# Patient Record
Sex: FEMALE | Race: WHITE | NOT HISPANIC OR LATINO | Employment: PART TIME | URBAN - METROPOLITAN AREA
[De-identification: names, ages, dates, MRNs, and addresses within clinical notes are randomized per-mention and may not be internally consistent; named-entity substitution may affect disease eponyms.]

---

## 2024-10-07 ENCOUNTER — APPOINTMENT (OUTPATIENT)
Age: 54
End: 2024-10-07
Payer: OTHER GOVERNMENT

## 2024-10-07 ENCOUNTER — OFFICE VISIT (OUTPATIENT)
Age: 54
End: 2024-10-07
Payer: OTHER GOVERNMENT

## 2024-10-07 VITALS
TEMPERATURE: 96.9 F | HEIGHT: 64 IN | SYSTOLIC BLOOD PRESSURE: 128 MMHG | DIASTOLIC BLOOD PRESSURE: 86 MMHG | RESPIRATION RATE: 16 BRPM | BODY MASS INDEX: 21.68 KG/M2 | HEART RATE: 57 BPM | OXYGEN SATURATION: 100 % | WEIGHT: 126.98 LBS

## 2024-10-07 DIAGNOSIS — M25.551 PAIN OF RIGHT HIP: ICD-10-CM

## 2024-10-07 DIAGNOSIS — M25.551 PAIN OF RIGHT HIP: Primary | ICD-10-CM

## 2024-10-07 DIAGNOSIS — M54.41 ACUTE RIGHT-SIDED LOW BACK PAIN WITH RIGHT-SIDED SCIATICA: ICD-10-CM

## 2024-10-07 PROCEDURE — 73502 X-RAY EXAM HIP UNI 2-3 VIEWS: CPT

## 2024-10-07 PROCEDURE — G0463 HOSPITAL OUTPT CLINIC VISIT: HCPCS | Performed by: EMERGENCY MEDICINE

## 2024-10-07 PROCEDURE — 99203 OFFICE O/P NEW LOW 30 MIN: CPT | Performed by: EMERGENCY MEDICINE

## 2024-10-07 RX ORDER — ALBUTEROL SULFATE 90 UG/1
INHALANT RESPIRATORY (INHALATION)
COMMUNITY
Start: 2024-09-19

## 2024-10-07 RX ORDER — ZIPRASIDONE HYDROCHLORIDE 80 MG/1
CAPSULE ORAL
COMMUNITY
Start: 2024-08-15

## 2024-10-07 RX ORDER — LORAZEPAM 1 MG/1
TABLET ORAL
COMMUNITY
Start: 2024-09-12

## 2024-10-07 RX ORDER — CYCLOBENZAPRINE HCL 10 MG
TABLET ORAL
COMMUNITY
Start: 2024-09-23

## 2024-10-07 RX ORDER — PREDNISONE 10 MG/1
TABLET ORAL
Qty: 19 TABLET | Refills: 0 | Status: SHIPPED | OUTPATIENT
Start: 2024-10-07

## 2024-10-07 RX ORDER — ZOLPIDEM TARTRATE 10 MG/1
TABLET ORAL
COMMUNITY
Start: 2024-09-12

## 2024-10-07 RX ORDER — TRIHEXYPHENIDYL HYDROCHLORIDE 2 MG/1
TABLET ORAL
COMMUNITY
Start: 2024-07-23

## 2024-10-07 NOTE — PATIENT INSTRUCTIONS
"Patient Education     Hip pain in adults   The Basics   Written by the doctors and editors at [facility]   What causes hip pain? -- Many different things can cause hip pain. Sometimes, pain is related to an injury. Other causes include:   Arthritis - This is the general term for \"inflammation or damage of the joints.\" People whose hip pain is caused by arthritis usually develop pain in the groin or thigh, slowly over time.   Bursitis - There are 3 sacs called \"bursae\" in or near the hip (figure 1). These sacs are filled with fluid. They help cushion and protect the joints. \"Bursitis\" is when 1 of these sacs gets irritated or inflamed. People whose hip pain is caused by bursitis usually feel more pain if they lie on their side, or if someone presses against the side of their hip.   Muscle or tendon strain - Three major muscle groups help move the hip. If you overuse these muscles or the tendons that attach them to your bones, it can lead to hip pain. This pain is usually worse when you move your leg in 1 particular direction.   Nerve problems - Lots of nerves pass by the hip. These nerves or nerves in the lower part of the spine can get pressed on or damaged and cause hip pain. People with pain caused by nerve problems also often feel tingling or numbness in the area. A pinched nerve in the spine could cause other problems, such as weakness in the leg.  How is hip pain treated? -- The right treatment depends on what is causing it. In general, treatments might include:   Taking medicines to reduce pain and/or inflammation   Getting a shot of a steroid medicine, which can reduce inflammation   Physical therapy or exercises recommended by the doctor  If you have severe hip arthritis, your doctor or nurse might suggest surgery to replace the hip.  What can I do on my own to feel better? -- You can:   Ask your doctor if there are stretches or exercises that can help with the cause of your pain. Before you do these " "exercises, warm up your muscles by walking or taking a warm shower or bath.   Consider taking non-prescription pain medicines, such as acetaminophen (sample brand name: Tylenol) or naproxen (sample brand name: Aleve). But if you have heart disease or other health problems, or if you are on prescription medicines, ask your doctor before you start taking any new medicines.   Use a cane, walker, shoe insert, or other device, if it helps you.   Apply a cold gel pack, bag of ice, or bag of frozen vegetables on your hip, if it helps with your pain. Do this every 1 to 2 hours, for 15 minutes each time. Put a thin towel between the ice (or other cold object) and your skin.  When should I call the doctor? -- Call for advice if:   Your pain is sudden and severe, prevents you from putting weight on that side, or is so bad that you can't rotate your leg to the side. Some people who have hip pain actually have a broken hip bone. This is especially likely after a fall or even a mild impact. Having a broken hip is serious and needs immediate medical attention.   Your hip is swollen, bruised, or bleeding.   You also have a fever of 100.4°F (38°C) or higher along with your hip pain.   You have weakness in 1 of your legs or feet.   Your toes or foot are numb or turn, blue, gray, or pale.  All topics are updated as new evidence becomes available and our peer review process is complete.  This topic retrieved from [employer] on: Apr 24, 2024.  Topic 61271 Version 15.0  Release: 32.3.2 - C32.113  © 2024 UpToDate, Inc. and/or its affiliates. All rights reserved.  figure 1: Bursae near the hip     These sacs, called \"bursae,\" are filled with fluid. They help cushion and protect the joints. \"Bursitis\" is the medical term for when 1 of these sacs gets irritated or inflamed.  Graphic 09276 Version 8.0  Consumer Information Use and Disclaimer   Disclaimer: This generalized information is a limited summary of diagnosis, treatment, and/or " medication information. It is not meant to be comprehensive and should be used as a tool to help the user understand and/or assess potential diagnostic and treatment options. It does NOT include all information about conditions, treatments, medications, side effects, or risks that may apply to a specific patient. It is not intended to be medical advice or a substitute for the medical advice, diagnosis, or treatment of a health care provider based on the health care provider's examination and assessment of a patient's specific and unique circumstances. Patients must speak with a health care provider for complete information about their health, medical questions, and treatment options, including any risks or benefits regarding use of medications. This information does not endorse any treatments or medications as safe, effective, or approved for treating a specific patient. UpToDate, Inc. and its affiliates disclaim any warranty or liability relating to this information or the use thereof.The use of this information is governed by the Terms of Use, available at https://www.[employer].com/en/know/clinical-effectiveness-terms. 2024© UpToDate, Inc. and its affiliates and/or licensors. All rights reserved.  Copyright   © 2024 UpToDate, Inc. and/or its affiliates. All rights reserved.  Patient Education     Low back pain - Discharge instructions   The Basics   Written by the doctors and editors at [facility]   What are discharge instructions? -- Discharge instructions are information about how to take care of yourself after getting medical care for a health problem.  What is low back pain? -- Low back pain is pain or discomfort in the lower part of your back. Many people have low back pain at some point, and it most often gets better on its own. Many different things can cause low back pain. Most of the time, doctors do not know the exact cause.  Back pain can happen if you strain a muscle. This is often what has happened  "when a person \"throws out\" their back. This refers to pain that starts suddenly after physical activity, like lifting something heavy or bending the back.  Back pain can also happen if you have (figure 1):   Damaged, bulging, or torn discs   Arthritis affecting the joints of the spine   Bony growths on the vertebrae that crowd nearby nerves   A \"compression fracture\" due to osteoporosis (a condition that makes your bones weak)   A vertebra out of place   Narrowing in the spinal canal   A tumor or infection (but this is very rare)  How do I care for myself at home? -- Ask the doctor or nurse what you should do when you go home. Make sure that you understand exactly what you need to do to care for yourself. Ask questions if there is anything you do not understand.  You should also:   Use heat on your back for short periods of time, if it helps with pain. Put a heating pad (on the low setting) on your back for 20 minutes at a time a few times each day. Never go to sleep with heat on your back.   Try to stay as active as you can without causing too much pain, if your doctor or nurse said that it is OK. If your pain is severe, you might need to rest for a day or 2. But it's important to get back to walking and moving as soon as possible. Try to keep doing your normal daily activities. Get up and move around gently during the day as you are able.   Slowly start to increase your activity level as you are able to. If something causes your pain to come back or get worse, stop and go back to doing easier activities that did not hurt.   Avoid sitting or standing in 1 position for a long time. You might want to sleep with a pillow under or between your knees if this eases your pain.   Take a medicine like ibuprofen (sample brand names: Advil, Motrin) or naproxen (brand name: Aleve) for pain, if needed. These are nonsteroidal antiinflammatory drugs (\"NSAIDs\"). They might work better than acetaminophen for low back pain.   Talk " "to your doctor or nurse before trying any of the following. These treatments might help you feel better for a little while:   Spinal manipulation - This is when a chiropractor, physical therapist, or other professional moves or \"adjusts\" the joints of your back.   Acupuncture - This is when someone who knows traditional Chinese medicine inserts tiny needles through your skin to block pain signals.   Massage therapy - The massage therapist manipulates your muscles and soft tissues to decrease muscle tension and increase relaxation.  What follow-up care do I need? -- Your doctor or nurse will tell you if you need to make a follow-up appointment. If so, make sure that you know when and where to go. The doctor might suggest that you see a physical therapist to learn exercises to help with your back pain.  When should I call the doctor? -- Call for emergency help right away (in the [place] and [place], call 9-1-1) if:   You are unable to walk, or cannot control your bowels or bladder.   You develop a fever of 100.4°F (38°C) or higher, chills, or night sweats.  Call your doctor for advice if:   Your legs are numb, weak, or tingly.   Your pain is getting worse, even with medicines and rest.   You develop a new rash.  All topics are updated as new evidence becomes available and our peer review process is complete.  This topic retrieved from [employer] on: May 15, 2024.  Topic 492168 Version 1.0  Release: 32.4.3 - C32.134  © 2024 UpToDate, Inc. and/or its affiliates. All rights reserved.  figure 1: Anatomy of the back     This drawing shows the different parts of the back. Back pain can be caused by problems with the muscles, ligaments, discs, bones (vertebrae), or nerves.  Graphic 38199 Version 6.0  Consumer Information Use and Disclaimer   Disclaimer: This generalized information is a limited summary of diagnosis, treatment, and/or medication information. It is not meant to be comprehensive and should be used as a tool to help the " user understand and/or assess potential diagnostic and treatment options. It does NOT include all information about conditions, treatments, medications, side effects, or risks that may apply to a specific patient. It is not intended to be medical advice or a substitute for the medical advice, diagnosis, or treatment of a health care provider based on the health care provider's examination and assessment of a patient's specific and unique circumstances. Patients must speak with a health care provider for complete information about their health, medical questions, and treatment options, including any risks or benefits regarding use of medications. This information does not endorse any treatments or medications as safe, effective, or approved for treating a specific patient. UpToDate, Inc. and its affiliates disclaim any warranty or liability relating to this information or the use thereof.The use of this information is governed by the Terms of Use, available at https://www.[employer].com/en/know/clinical-effectiveness-terms. 2024© UpToDate, Inc. and its affiliates and/or licensors. All rights reserved.  Copyright   © 2024 UpToDate, Inc. and/or its affiliates. All rights reserved.

## 2024-10-07 NOTE — PROGRESS NOTES
"[facility]        NAME: Em Koo is a 54 y.o. female  : 1970    MRN: [id number]  DATE: 2024  TIME: 9:54 AM    Assessment and Plan   Pain of right hip [M25.551]  1. Pain of right hip  XR hip/pelv 2-3 vws right if performed    predniSONE 10 mg tablet    Ambulatory Referral to Physical Therapy      2. Acute right-sided low back pain with right-sided sciatica  predniSONE 10 mg tablet    Ambulatory Referral to Physical Therapy              Patient Instructions     Patient Instructions   Patient Education     Hip pain in adults   The Basics   Written by the doctors and editors at [facility]   What causes hip pain? -- Many different things can cause hip pain. Sometimes, pain is related to an injury. Other causes include:   Arthritis - This is the general term for \"inflammation or damage of the joints.\" People whose hip pain is caused by arthritis usually develop pain in the groin or thigh, slowly over time.   Bursitis - There are 3 sacs called \"bursae\" in or near the hip (figure 1). These sacs are filled with fluid. They help cushion and protect the joints. \"Bursitis\" is when 1 of these sacs gets irritated or inflamed. People whose hip pain is caused by bursitis usually feel more pain if they lie on their side, or if someone presses against the side of their hip.   Muscle or tendon strain - Three major muscle groups help move the hip. If you overuse these muscles or the tendons that attach them to your bones, it can lead to hip pain. This pain is usually worse when you move your leg in 1 particular direction.   Nerve problems - Lots of nerves pass by the hip. These nerves or nerves in the lower part of the spine can get pressed on or damaged and cause hip pain. People with pain caused by nerve problems also often feel tingling or numbness in the area. A pinched nerve in the spine could cause other problems, such as weakness in the leg.  How is hip pain treated? -- The right treatment depends " on what is causing it. In general, treatments might include:   Taking medicines to reduce pain and/or inflammation   Getting a shot of a steroid medicine, which can reduce inflammation   Physical therapy or exercises recommended by the doctor  If you have severe hip arthritis, your doctor or nurse might suggest surgery to replace the hip.  What can I do on my own to feel better? -- You can:   Ask your doctor if there are stretches or exercises that can help with the cause of your pain. Before you do these exercises, warm up your muscles by walking or taking a warm shower or bath.   Consider taking non-prescription pain medicines, such as acetaminophen (sample brand name: Tylenol) or naproxen (sample brand name: Aleve). But if you have heart disease or other health problems, or if you are on prescription medicines, ask your doctor before you start taking any new medicines.   Use a cane, walker, shoe insert, or other device, if it helps you.   Apply a cold gel pack, bag of ice, or bag of frozen vegetables on your hip, if it helps with your pain. Do this every 1 to 2 hours, for 15 minutes each time. Put a thin towel between the ice (or other cold object) and your skin.  When should I call the doctor? -- Call for advice if:   Your pain is sudden and severe, prevents you from putting weight on that side, or is so bad that you can't rotate your leg to the side. Some people who have hip pain actually have a broken hip bone. This is especially likely after a fall or even a mild impact. Having a broken hip is serious and needs immediate medical attention.   Your hip is swollen, bruised, or bleeding.   You also have a fever of 100.4°F (38°C) or higher along with your hip pain.   You have weakness in 1 of your legs or feet.   Your toes or foot are numb or turn, blue, gray, or pale.  All topics are updated as new evidence becomes available and our peer review process is complete.  This topic retrieved from [employer] on: Apr 24,  "2024.  Topic 41892 Version 15.0  Release: 32.3.2 - C32.113  © 2024 UpToDate, Inc. and/or its affiliates. All rights reserved.  figure 1: Bursae near the hip     These sacs, called \"bursae,\" are filled with fluid. They help cushion and protect the joints. \"Bursitis\" is the medical term for when 1 of these sacs gets irritated or inflamed.  Graphic 54973 Version 8.0  Consumer Information Use and Disclaimer   Disclaimer: This generalized information is a limited summary of diagnosis, treatment, and/or medication information. It is not meant to be comprehensive and should be used as a tool to help the user understand and/or assess potential diagnostic and treatment options. It does NOT include all information about conditions, treatments, medications, side effects, or risks that may apply to a specific patient. It is not intended to be medical advice or a substitute for the medical advice, diagnosis, or treatment of a health care provider based on the health care provider's examination and assessment of a patient's specific and unique circumstances. Patients must speak with a health care provider for complete information about their health, medical questions, and treatment options, including any risks or benefits regarding use of medications. This information does not endorse any treatments or medications as safe, effective, or approved for treating a specific patient. UpToDate, Inc. and its affiliates disclaim any warranty or liability relating to this information or the use thereof.The use of this information is governed by the Terms of Use, available at https://www.wolterskluwer.com/en/know/clinical-effectiveness-terms. 2024© UpToDate, Inc. and its affiliates and/or licensors. All rights reserved.  Copyright   © 2024 UpToDate, Inc. and/or its affiliates. All rights reserved.  Patient Education     Low back pain - Discharge instructions   The Basics   Written by the doctors and editors at [employer]   What are discharge " "instructions? -- Discharge instructions are information about how to take care of yourself after getting medical care for a health problem.  What is low back pain? -- Low back pain is pain or discomfort in the lower part of your back. Many people have low back pain at some point, and it most often gets better on its own. Many different things can cause low back pain. Most of the time, doctors do not know the exact cause.  Back pain can happen if you strain a muscle. This is often what has happened when a person \"throws out\" their back. This refers to pain that starts suddenly after physical activity, like lifting something heavy or bending the back.  Back pain can also happen if you have (figure 1):   Damaged, bulging, or torn discs   Arthritis affecting the joints of the spine   Bony growths on the vertebrae that crowd nearby nerves   A \"compression fracture\" due to osteoporosis (a condition that makes your bones weak)   A vertebra out of place   Narrowing in the spinal canal   A tumor or infection (but this is very rare)  How do I care for myself at home? -- Ask the doctor or nurse what you should do when you go home. Make sure that you understand exactly what you need to do to care for yourself. Ask questions if there is anything you do not understand.  You should also:   Use heat on your back for short periods of time, if it helps with pain. Put a heating pad (on the low setting) on your back for 20 minutes at a time a few times each day. Never go to sleep with heat on your back.   Try to stay as active as you can without causing too much pain, if your doctor or nurse said that it is OK. If your pain is severe, you might need to rest for a day or 2. But it's important to get back to walking and moving as soon as possible. Try to keep doing your normal daily activities. Get up and move around gently during the day as you are able.   Slowly start to increase your activity level as you are able to. If something " "causes your pain to come back or get worse, stop and go back to doing easier activities that did not hurt.   Avoid sitting or standing in 1 position for a long time. You might want to sleep with a pillow under or between your knees if this eases your pain.   Take a medicine like ibuprofen (sample brand names: Advil, Motrin) or naproxen (brand name: Aleve) for pain, if needed. These are nonsteroidal antiinflammatory drugs (\"NSAIDs\"). They might work better than acetaminophen for low back pain.   Talk to your doctor or nurse before trying any of the following. These treatments might help you feel better for a little while:   Spinal manipulation - This is when a chiropractor, physical therapist, or other professional moves or \"adjusts\" the joints of your back.   Acupuncture - This is when someone who knows traditional Chinese medicine inserts tiny needles through your skin to block pain signals.   Massage therapy - The massage therapist manipulates your muscles and soft tissues to decrease muscle tension and increase relaxation.  What follow-up care do I need? -- Your doctor or nurse will tell you if you need to make a follow-up appointment. If so, make sure that you know when and where to go. The doctor might suggest that you see a physical therapist to learn exercises to help with your back pain.  When should I call the doctor? -- Call for emergency help right away (in the [place] and [place], call 9-1-1) if:   You are unable to walk, or cannot control your bowels or bladder.   You develop a fever of 100.4°F (38°C) or higher, chills, or night sweats.  Call your doctor for advice if:   Your legs are numb, weak, or tingly.   Your pain is getting worse, even with medicines and rest.   You develop a new rash.  All topics are updated as new evidence becomes available and our peer review process is complete.  This topic retrieved from [employer] on: May 15, 2024.  Topic 473894 Version 1.0  Release: 32.4.3 - C32.134  © 2024 " UpToDate, Inc. and/or its affiliates. All rights reserved.  figure 1: Anatomy of the back     This drawing shows the different parts of the back. Back pain can be caused by problems with the muscles, ligaments, discs, bones (vertebrae), or nerves.  Graphic 87537 Version 6.0  Consumer Information Use and Disclaimer   Disclaimer: This generalized information is a limited summary of diagnosis, treatment, and/or medication information. It is not meant to be comprehensive and should be used as a tool to help the user understand and/or assess potential diagnostic and treatment options. It does NOT include all information about conditions, treatments, medications, side effects, or risks that may apply to a specific patient. It is not intended to be medical advice or a substitute for the medical advice, diagnosis, or treatment of a health care provider based on the health care provider's examination and assessment of a patient's specific and unique circumstances. Patients must speak with a health care provider for complete information about their health, medical questions, and treatment options, including any risks or benefits regarding use of medications. This information does not endorse any treatments or medications as safe, effective, or approved for treating a specific patient. UpToDate, Inc. and its affiliates disclaim any warranty or liability relating to this information or the use thereof.The use of this information is governed by the Terms of Use, available at https://www.WomplytersDashbookuwer.com/en/know/clinical-effectiveness-terms. 2024© UpToDate, Inc. and its affiliates and/or licensors. All rights reserved.  Copyright   © 2024 UpToDate, Inc. and/or its affiliates. All rights reserved.      Follow up with PCP in 3-5 days.  Proceed to  ER if symptoms worsen.    Chief Complaint     Chief Complaint   Patient presents with    Hip Pain     Runner. X1 month ago was on a longer run, after she felt stiff. She did continue to  treat with ice, heat, stim. After that she reduced the mileage she was running and hip/right low back pain continued. Seen at [facility] on 9/23/24. At this point, she is unable to perform at work like she used to. She finished prednisone, muscle relaxer.          History of Present Illness       Patient complains of right hip and right low back pain for the past month onset while distance running and worsening since.  Patient seen at [facility] 2 weeks ago for same prescribed prednisone taper [id number] which ended 1 week ago and muscle relaxant which she has 2 pills remaining.  She notes pain now present not only when running but when at work.        Review of Systems   Review of Systems   Constitutional:  Negative for activity change.   Gastrointestinal:  Negative for abdominal pain.   Musculoskeletal:  Positive for arthralgias and gait problem. Negative for back pain, joint swelling, myalgias, neck pain and neck stiffness.   Skin:  Negative for color change and wound.   Neurological:  Negative for dizziness, syncope, weakness, light-headedness and headaches.         Current Medications       Current Outpatient Medications:     albuterol (PROVENTIL HFA,VENTOLIN HFA) 90 mcg/act inhaler, , Disp: , Rfl:     cyclobenzaprine (FLEXERIL) 10 mg tablet, , Disp: , Rfl:     LORazepam (ATIVAN) 1 mg tablet, , Disp: , Rfl:     predniSONE 10 mg tablet, Take once daily all days pills on this schedule 4 -4 -3 -3 -2 -2 -1, Disp: 19 tablet, Rfl: 0    trihexyphenidyl (ARTANE) 2 mg tablet, , Disp: , Rfl:     ziprasidone (GEODON) 80 mg capsule, , Disp: , Rfl:     zolpidem (AMBIEN) 10 mg tablet, , Disp: , Rfl:     Current Allergies     Allergies as of 10/07/2024    (No Known Allergies)            The following portions of the patient's history were reviewed and updated as appropriate: allergies, current medications, past family history, past medical history, past social history, past surgical history and problem list.  "    Past Medical History:   Diagnosis Date    Anxiety     Chronic paranoid schizophrenia (HCC)        Past Surgical History:   Procedure Laterality Date    ADENOIDECTOMY      TONSILLECTOMY         Family History   Problem Relation Age of Onset    Cancer Father         leukemia         Medications have been verified.        Objective   /86   Pulse 57   Temp (!) 96.9 °F (36.1 °C)   Resp 16   Ht 5' 4\" (1.626 m)   Wt 57.6 kg (126 lb 15.8 oz)   SpO2 100%   BMI 21.80 kg/m²        Physical Exam     Physical Exam  Vitals and nursing note reviewed.   Constitutional:       General: She is not in acute distress.     Appearance: She is well-developed. She is not ill-appearing.   HENT:      Head: Normocephalic and atraumatic.   Eyes:      Conjunctiva/sclera: Conjunctivae normal.      Pupils: Pupils are equal, round, and reactive to light.   Musculoskeletal:         General: Tenderness present. No swelling.      Cervical back: Normal range of motion and neck supple.      Comments: Limited internal rotation to 15 degrees both hips, tender over olecranon bursa right tender SI joint right   Skin:     General: Skin is warm and dry.      Findings: No rash.   Neurological:      Mental Status: She is alert and oriented to person, place, and time.      Deep Tendon Reflexes: Reflexes normal.   Psychiatric:         Mood and Affect: Mood normal.         Behavior: Behavior normal.         Thought Content: Thought content normal.         Judgment: Judgment normal.                   "

## 2024-11-01 ENCOUNTER — EVALUATION (OUTPATIENT)
Age: 54
End: 2024-11-01
Payer: MEDICARE

## 2024-11-01 DIAGNOSIS — Z74.09 IMPAIRED FUNCTIONAL MOBILITY, BALANCE, GAIT, AND ENDURANCE: ICD-10-CM

## 2024-11-01 DIAGNOSIS — R29.898 WEAKNESS OF RIGHT HIP: ICD-10-CM

## 2024-11-01 DIAGNOSIS — M25.551 RIGHT HIP PAIN: Primary | ICD-10-CM

## 2024-11-01 DIAGNOSIS — Z09 S/P ORTHOPEDIC SURGERY, FOLLOW-UP EXAM: ICD-10-CM

## 2024-11-01 PROCEDURE — 97112 NEUROMUSCULAR REEDUCATION: CPT

## 2024-11-01 PROCEDURE — 97110 THERAPEUTIC EXERCISES: CPT

## 2024-11-01 PROCEDURE — 97161 PT EVAL LOW COMPLEX 20 MIN: CPT

## 2024-11-01 NOTE — LETTER
2024    Zuly Nichole PA-C  [address] [facility] [address]    Patient: Em Koo   YOB: 1970   Date of Visit: 2024     Encounter Diagnosis     ICD-10-CM    1. Right hip pain  M25.551       2. S/P orthopedic surgery, follow-up exam  Z09       3. Impaired functional mobility, balance, gait, and endurance  Z74.09       4. Weakness of right hip  R29.898           Dear Dr. Nichole:    Thank you for your recent referral of Em Koo. Please review the attached evaluation summary from Em's recent visit.     Please verify that you agree with the plan of care by signing the attached order.     If you have any questions or concerns, please do not hesitate to call.     I sincerely appreciate the opportunity to share in the care of one of your patients and hope to have another opportunity to work with you in the near future.       Sincerely,    Cherie Monterroso, PT      Referring Provider:      I certify that I have read the below Plan of Care and certify the need for these services furnished under this plan of treatment while under my care.                    Zuly Nichole PA-C  [address] [facility] [address]  Via Fax: [phone]          PT Evaluation     Today's date: 2024  Patient name: Em Koo  : 1970  MRN: [id number]  Referring provider: Zuly Nichole PA-C  Dx:   Encounter Diagnosis     ICD-10-CM    1. Right hip pain  M25.551       2. S/P orthopedic surgery, follow-up exam  Z09       3. Impaired functional mobility, balance, gait, and endurance  Z74.09       4. Weakness of right hip  R29.898           Start Time: 815  Stop Time: 915  Total time in clinic (min): 60 minutes    Assessment  Impairments: abnormal gait, abnormal or restricted ROM, activity intolerance, impaired balance, impaired physical strength, lacks appropriate home exercise program, pain with function, safety issue, weight-bearing intolerance, unable to  perform ADL, participation limitations, activity limitations and endurance    Assessment details: Em is a 54 y.o. female who presents to outpatient physical therapy s/p R hip fracture and ORIF 10/12/24. Primary impairments include ROM deficits, strength deficits, decreased tolerance to activity, decreased muscular endurance, impaired balance, impaired neuromuscular control, and inability to complete basic ADLs . They present with the previously stated impairments which limit their ability to perform STS, toilet transfers, get into and out of a low riding car, step into and out of a tub shower, perform basic I/ADLs including dressing and donning/ doffing shoes and socks, and standing/ walking without AD/ modifications or increased pain/ discomfort. Pt is considered a fall risk based on time taken to complete the TUG. Provided pt with education regarding HEP, ice/ heat/ signs/ symptoms of infection and DVT, and anatomy of condition. Provided pt with an HEP. Em is currently functioning below their prior level of function and is a good rehab candidate who would benefit from skilled outpatient physical therapy services to allow them to reach their goals and return to OF. Pt recommended for 2x a week for 6-8 weeks. Pt prognosis for therapy is good. PT discussed POC and goals with patient, patient was agreeable.      Physical therapist assistant (PTA) may be utilized to administer treatments as appropriate and in accordance with [facility] Physical Therapy Practice Act.      Goals  STG: To be completed in 4 weeks from 11/1/24.   1. Em will be independent with basic HEP to allow patient to complete exercises safely when not directly supervised during PT session.    2. Em will report pain no worse than 3/10 at worst to indicate decreased pain level and improved participation with activity.  3. Em will have improved R hip flexion ROM to 60* and R hip extension ROM to 10* in order to indicate  progression to promoting normalized gait pattern.   4. Em will have improved R LE strength to 3+/5 in all motion to improve quality of ambulation and stair climbing.       LTG: To be completed in 8 weeks from 11/1/24.   1. Em will have an improved FOTO score of 54 in order to demonstrate decreased pain with activity, improved functional strength, improved participation with activity, and improved QOL.  2. Em will report pain no worse than 1/10 at worst to indicate decreased pain level and improved participation with activity.  3. Em will report 60% improvement in symptoms compared to start of POC to indicate functional improvement and decrease level of disability.    4. Em will be independent with advanced home exercise program to allow patient to transition from physical therapy care to continue with plan of care at home without supervision from therapist and continue to progress with rehabilitation.  5. Em will tolerate single limb stance for 10 seconds on R leg to allow patient to have increased stance time on R leg during ambulation.   6. Em will have improved R hip flexion ROM to 120* and R hip extension ROM to 30* in order to indicate normal hip ROM.  7. Em will have improved R LE strength to 4/5 in with all motions to improve quality of ambulation, transfers, and stair climbing.        Plan  Patient would benefit from: PT eval and skilled physical therapy  Planned modality interventions: biofeedback, cryotherapy, thermotherapy: hydrocollator packs, ultrasound and unattended electrical stimulation    Planned therapy interventions: balance, balance/weight bearing training, flexibility, functional ROM exercises, gait training, graded exercise, graded activity, graded motor, transfer training, therapeutic exercise, therapeutic activities, stretching, strengthening, patient education, IASTM, joint mobilization, kinesiology taping, manual therapy, massage, Greco taping,  "neuromuscular re-education, nerve gliding, patient/caregiver education, postural training, activity modification, ADL retraining, ADL training, abdominal trunk stabilization and home exercise program    Frequency: 2x week  Duration in weeks: 8  Plan of Care beginning date: 11/1/2024  Plan of Care expiration date: 12/27/2024  Treatment plan discussed with: patient        Subjective Evaluation    History of Present Illness  Mechanism of injury: Em is a 54 y.o. female. They present to outpatient physical therapy with the primary complaint of R hip pain.  They are referred to OPPT by Zuly Nichole PA-C.  Em reports she was having pain that was initially assumed to be back pain. Pt reports she had an X-ray of the R hip on 10/7/24. Pt was informed that she had R hip bursitis. Pt reports on 10/12 her R leg gave out, pt went for more imaging. Pt was informed from imaging that she had a fracture. Pt reports that she then underwent R hip surgery to fix the fracture. Pt currently ambulates with the RW using step through gait. Em is only able to sit for 30 minutes, stand for 1 hour with RW, and walk for about 30 minutes with RW before onset of symptoms. Pt reports that she is sleeping in a recliner. Em reports that they sleep supine. Pt reports sleep disturbances every 1.5-2 hours. Pt reports difficulty with STS, bed mobility, stair negotiation (ascending > descending) requiring b/l UE support, stepping into and out of a tub shower, getting into and out of a low car, and basic I/ADLs including dressing/ donning and doffing shoes and socks.     Patient Goals  Patient goals for therapy: decreased pain, improved balance, increased strength, independence with ADLs/IADLs, return to sport/leisure activities, return to work and increased motion  Patient goal: \" be able to drive\", \"be tyrone to walk without an AD\", \"be able to walk for about 1.25 miles with her dog\", \"be able to run 1 miles\"  Pain  Current pain rating: " 5 (proximal hamstring, groin, over the incision)  At best pain ratin  At worst pain ratin (R groin bakc of the R leg)  Quality: tight, pressure, burning, sharp, dull ache, radiating, cramping, discomfort, pulling, throbbing and squeezing  Relieving factors: medications, ice, change in position and rest (tylenol 500 mg as needed, massage)  Aggravating factors: sitting, standing, walking and stair climbing (STS transfers, toilet transfers, transfers from a low car, dressing, getting into and out of the tub shower)  Progression: improved    Social Support  Exterior steps/ramp assessed: 2 RICKY no railing.  Interior stairs assessed: 1 flight of stairs to the second floor and basement.   Lives in: multiple-level home  Lives with: spouse and adult children (dog)    Employment status: working (currently on leave; pt works on a [employer])  Hand dominance: right  Exercise history: run, bike, walking the dog, go to the gym, swimming          Objective     Active Range of Motion   Left Hip   Normal active range of motion    Right Hip   Flexion: 40 degrees with pain  Abduction: 18 (groin, glute, over incision) degrees with pain    Passive Range of Motion     Right Hip   Flexion: 50 degrees with pain  Abduction: Right hip passive abduction: did not perform due to pain.     Strength/Myotome Testing     Left Hip   Planes of Motion   Flexion: 4-  Abduction: 4  Adduction: 4+    Right Hip   Planes of Motion   Flexion: 2+  Abduction: 2+  Adduction: 2+    Left Knee   Flexion: 4+  Extension: 4    Right Knee   Flexion: 2+  Extension: 2+    Left Ankle/Foot   Dorsiflexion: 4+    Right Ankle/Foot   Dorsiflexion: 4-    Ambulation   Weight-Bearing Status   Weight-Bearing Status (Left): full weight bearing   Weight-Bearing Status (Right): weight-bearing as tolerated    Assistive device used: front-wheeled walker    Ambulation: Level Surfaces   Ambulation with assistive device: independent  Ambulation without assistive device:  "unable    Ambulation: Stairs   Ascend stairs: independent (with RW)  Pattern: non-reciprocal  Use of stair rails: with RW.    Observational Gait   Gait: antalgic   Decreased walking speed, stride length and right stance time.   Right foot contact pattern: foot flat    Comments   Pt presented with RW using step to gait mechanics.    Functional Assessment        Comments  IE: 24  - TU seconds from standard height chair with b/l UE support and RW using step to gait  - 30 STS: x5 from standard height chair with b/l UE support      Flowsheet Rows      Flowsheet Row Most Recent Value   PT/OT G-Codes    Current Score 29   Projected Score 54               Precautions: standard precautions, anxiety, chronic paranoid schizophrenia       Date:             Visit #: 1 2 3 4 5 6 7 8 9 10   Manuals             PROM/ stretching nv            Hip mobs (distraction/ ant/ post) nv            STM/ IASTM             Scar massage nv            Manual resistance nv                         Neuro Re-Ed             EDU/ HEP EDU on HEP, ice/ heat, signs/ symptoms of infection, and anatomy of condition            Quad set nv            Heel slides nv            Hip ABD slides nv            Seated marches nv            STS nv            Step ups             Step downs                                                    Ther Ex             Nustep nv            bike             Hip ABD PTB 10x5\" holds ea            Hip ADD 10x5\" holds ea            Standing marches              Standing foot slides  3 directions nv            Leg press                          Ther Activity                                       Gait Training                                       Modalities                                                            "

## 2024-11-01 NOTE — PROGRESS NOTES
PT Evaluation     Today's date: 2024  Patient name: Em Koo  : 1970  MRN: [id number]  Referring provider: Zuly Nichole PA-C  Dx:   Encounter Diagnosis     ICD-10-CM    1. Right hip pain  M25.551       2. S/P orthopedic surgery, follow-up exam  Z09       3. Impaired functional mobility, balance, gait, and endurance  Z74.09       4. Weakness of right hip  R29.898           Start Time: 815  Stop Time: 915  Total time in clinic (min): 60 minutes    Assessment  Impairments: abnormal gait, abnormal or restricted ROM, activity intolerance, impaired balance, impaired physical strength, lacks appropriate home exercise program, pain with function, safety issue, weight-bearing intolerance, unable to perform ADL, participation limitations, activity limitations and endurance    Assessment details: Em is a 54 y.o. female who presents to outpatient physical therapy s/p R hip fracture and ORIF 10/12/24. Primary impairments include ROM deficits, strength deficits, decreased tolerance to activity, decreased muscular endurance, impaired balance, impaired neuromuscular control, and inability to complete basic ADLs . They present with the previously stated impairments which limit their ability to perform STS, toilet transfers, get into and out of a low riding car, step into and out of a tub shower, perform basic I/ADLs including dressing and donning/ doffing shoes and socks, and standing/ walking without AD/ modifications or increased pain/ discomfort. Pt is considered a fall risk based on time taken to complete the TUG. Provided pt with education regarding HEP, ice/ heat/ signs/ symptoms of infection and DVT, and anatomy of condition. Provided pt with an HEP. Em is currently functioning below their prior level of function and is a good rehab candidate who would benefit from skilled outpatient physical therapy services to allow them to reach their goals and return to PLOF. Pt recommended for 2x a week  for 6-8 weeks. Pt prognosis for therapy is good. PT discussed POC and goals with patient, patient was agreeable.      Physical therapist assistant (PTA) may be utilized to administer treatments as appropriate and in accordance with [facility] Physical Therapy Practice Act.      Goals  STG: To be completed in 4 weeks from 11/1/24.   1. Em will be independent with basic HEP to allow patient to complete exercises safely when not directly supervised during PT session.    2. Em will report pain no worse than 3/10 at worst to indicate decreased pain level and improved participation with activity.  3. Em will have improved R hip flexion ROM to 60* and R hip extension ROM to 10* in order to indicate progression to promoting normalized gait pattern.   4. Em will have improved R LE strength to 3+/5 in all motion to improve quality of ambulation and stair climbing.       LTG: To be completed in 8 weeks from 11/1/24.   1. Em will have an improved FOTO score of 54 in order to demonstrate decreased pain with activity, improved functional strength, improved participation with activity, and improved QOL.  2. Em will report pain no worse than 1/10 at worst to indicate decreased pain level and improved participation with activity.  3. Em will report 60% improvement in symptoms compared to start of POC to indicate functional improvement and decrease level of disability.    4. Em will be independent with advanced home exercise program to allow patient to transition from physical therapy care to continue with plan of care at home without supervision from therapist and continue to progress with rehabilitation.  5. Em will tolerate single limb stance for 10 seconds on R leg to allow patient to have increased stance time on R leg during ambulation.   6. Em will have improved R hip flexion ROM to 120* and R hip extension ROM to 30* in order to indicate normal hip ROM.  7. Em will have  improved R LE strength to 4/5 in with all motions to improve quality of ambulation, transfers, and stair climbing.        Plan  Patient would benefit from: PT eval and skilled physical therapy  Planned modality interventions: biofeedback, cryotherapy, thermotherapy: hydrocollator packs, ultrasound and unattended electrical stimulation    Planned therapy interventions: balance, balance/weight bearing training, flexibility, functional ROM exercises, gait training, graded exercise, graded activity, graded motor, transfer training, therapeutic exercise, therapeutic activities, stretching, strengthening, patient education, IASTM, joint mobilization, kinesiology taping, manual therapy, massage, Greco taping, neuromuscular re-education, nerve gliding, patient/caregiver education, postural training, activity modification, ADL retraining, ADL training, abdominal trunk stabilization and home exercise program    Frequency: 2x week  Duration in weeks: 8  Plan of Care beginning date: 11/1/2024  Plan of Care expiration date: 12/27/2024  Treatment plan discussed with: patient        Subjective Evaluation    History of Present Illness  Mechanism of injury: Em is a 54 y.o. female. They present to outpatient physical therapy with the primary complaint of R hip pain.  They are referred to OPPT by Zuly Nichole PA-C.  Em reports she was having pain that was initially assumed to be back pain. Pt reports she had an X-ray of the R hip on 10/7/24. Pt was informed that she had R hip bursitis. Pt reports on 10/12 her R leg gave out, pt went for more imaging. Pt was informed from imaging that she had a fracture. Pt reports that she then underwent R hip surgery to fix the fracture. Pt currently ambulates with the RW using step through gait. Em is only able to sit for 30 minutes, stand for 1 hour with RW, and walk for about 30 minutes with RW before onset of symptoms. Pt reports that she is sleeping in a recliner. Em  "reports that they sleep supine. Pt reports sleep disturbances every 1.5-2 hours. Pt reports difficulty with STS, bed mobility, stair negotiation (ascending > descending) requiring b/l UE support, stepping into and out of a tub shower, getting into and out of a low car, and basic I/ADLs including dressing/ donning and doffing shoes and socks.     Patient Goals  Patient goals for therapy: decreased pain, improved balance, increased strength, independence with ADLs/IADLs, return to sport/leisure activities, return to work and increased motion  Patient goal: \" be able to drive\", \"be tyrone to walk without an AD\", \"be able to walk for about 1.25 miles with her dog\", \"be able to run 1 miles\"  Pain  Current pain ratin (proximal hamstring, groin, over the incision)  At best pain ratin  At worst pain ratin (R groin bakc of the R leg)  Quality: tight, pressure, burning, sharp, dull ache, radiating, cramping, discomfort, pulling, throbbing and squeezing  Relieving factors: medications, ice, change in position and rest (tylenol 500 mg as needed, massage)  Aggravating factors: sitting, standing, walking and stair climbing (STS transfers, toilet transfers, transfers from a low car, dressing, getting into and out of the tub shower)  Progression: improved    Social Support  Exterior steps/ramp assessed: 2 RICKY no railing.  Interior stairs assessed: 1 flight of stairs to the second floor and basement.   Lives in: multiple-level home  Lives with: spouse and adult children (dog)    Employment status: working (currently on leave; pt works on a [employer])  Hand dominance: right  Exercise history: run, bike, walking the dog, go to the gym, swimming          Objective     Active Range of Motion   Left Hip   Normal active range of motion    Right Hip   Flexion: 40 degrees with pain  Abduction: 18 (groin, glute, over incision) degrees with pain    Passive Range of Motion     Right Hip   Flexion: 50 degrees with pain  Abduction: " Right hip passive abduction: did not perform due to pain.     Strength/Myotome Testing     Left Hip   Planes of Motion   Flexion: 4-  Abduction: 4  Adduction: 4+    Right Hip   Planes of Motion   Flexion: 2+  Abduction: 2+  Adduction: 2+    Left Knee   Flexion: 4+  Extension: 4    Right Knee   Flexion: 2+  Extension: 2+    Left Ankle/Foot   Dorsiflexion: 4+    Right Ankle/Foot   Dorsiflexion: 4-    Ambulation   Weight-Bearing Status   Weight-Bearing Status (Left): full weight bearing   Weight-Bearing Status (Right): weight-bearing as tolerated    Assistive device used: front-wheeled walker    Ambulation: Level Surfaces   Ambulation with assistive device: independent  Ambulation without assistive device: unable    Ambulation: Stairs   Ascend stairs: independent (with RW)  Pattern: non-reciprocal  Use of stair rails: with RW.    Observational Gait   Gait: antalgic   Decreased walking speed, stride length and right stance time.   Right foot contact pattern: foot flat    Comments   Pt presented with RW using step to gait mechanics.    Functional Assessment        Comments  IE: 24  - TU seconds from standard height chair with b/l UE support and RW using step to gait  - 30 STS: x5 from standard height chair with b/l UE support      Flowsheet Rows      Flowsheet Row Most Recent Value   PT/OT G-Codes    Current Score 29   Projected Score 54               Precautions: standard precautions, anxiety, chronic paranoid schizophrenia       Date:             Visit #: 1 2 3 4 5 6 7 8 9 10   Manuals             PROM/ stretching nv            Hip mobs (distraction/ ant/ post) nv            STM/ IASTM             Scar massage nv            Manual resistance nv                         Neuro Re-Ed             EDU/ HEP EDU on HEP, ice/ heat, signs/ symptoms of infection, and anatomy of condition            Quad set nv            Heel slides nv            Hip ABD slides nv            Seated marches nv            STS nv        "     Step ups             Step downs                                                    Ther Ex             Nustep nv            bike             Hip ABD PTB 10x5\" holds ea            Hip ADD 10x5\" holds ea            Standing marches              Standing foot slides  3 directions nv            Leg press                          Ther Activity                                       Gait Training                                       Modalities                                            "

## 2024-11-04 ENCOUNTER — OFFICE VISIT (OUTPATIENT)
Age: 54
End: 2024-11-04
Payer: MEDICARE

## 2024-11-04 DIAGNOSIS — Z74.09 IMPAIRED FUNCTIONAL MOBILITY, BALANCE, GAIT, AND ENDURANCE: ICD-10-CM

## 2024-11-04 DIAGNOSIS — M25.551 RIGHT HIP PAIN: Primary | ICD-10-CM

## 2024-11-04 DIAGNOSIS — R29.898 WEAKNESS OF RIGHT HIP: ICD-10-CM

## 2024-11-04 DIAGNOSIS — Z09 S/P ORTHOPEDIC SURGERY, FOLLOW-UP EXAM: ICD-10-CM

## 2024-11-04 PROCEDURE — 97110 THERAPEUTIC EXERCISES: CPT

## 2024-11-04 PROCEDURE — 97112 NEUROMUSCULAR REEDUCATION: CPT

## 2024-11-04 NOTE — PROGRESS NOTES
Daily Note     Today's date: 2024  Patient name: Em Koo  : 1970  MRN: [id number]  Referring provider: Zuly Nichole, *  Dx:   Encounter Diagnosis     ICD-10-CM    1. Right hip pain  M25.551       2. S/P orthopedic surgery, follow-up exam  Z09       3. Impaired functional mobility, balance, gait, and endurance  Z74.09       4. Weakness of right hip  R29.898           Start Time: 1530  Stop Time: 1615  Total time in clinic (min): 45 minutes    Subjective: Pt reports minimal discomfort in her R groin today. Pt reports despite it being later in the day she is feeling pretty good. Pt continues to report compliance with her HEP and denies any questions or concerns at this time.      Objective: See treatment diary below      Assessment: Pt performed Nustep aerobic exercises to increase blood flow to the area being treated, prepare the muscles for strength training and stretching, improve overall tolerance to activity, and aerobic endurance. When performing standing heel slides in all planes pt was able place about 70% of weight into the R LE when used as the stabilizing LE. Pt had greatest difficulty with seated and standing marches noting mild discomfort into her R groin. When ambulating, focused around improving heel toe contact. Pt was able to progress to step through gait mechanics with the RW. Provided pt with updated HEP.  Pt continues to benefit from physical therapy in order to continue progressing towards goals as outlined and return to PLOF. Will continue to modify and advance current POC based on overall progress and symptom irritability.       Plan: Continue per plan of care.  Progress treatment as tolerated.       Precautions: standard precautions, anxiety, chronic paranoid schizophrenia       Date:            Visit #: 1 2 3 4 5 6 7 8 9 10   Manuals             PROM/ stretching nv nv           Hip mobs (distraction/ ant/ post) nv nv           STM/ IASTM             Scar  "massage nv            Manual resistance nv nv                        Neuro Re-Ed             EDU/ HEP EDU on HEP, ice/ heat, signs/ symptoms of infection, and anatomy of condition RR           Quad set nv nv           LAQ  10x5\"holds ea           Heel slides nv 10x5\" holds ea            Hip ABD slides nv nv           Seated marches nv X10 ea b/l           STS nv nv           Step ups             Step downs             4 way hip  nv                                     Ther Ex             Nustep nv 10' L3           bike             Hip ABD PTB 10x5\" holds ea PTB 2' x10\" holds           Hip ADD 10x5\" holds ea 2' x10\" holds ea           Standing marches   X10 ea 2 HHA at walker           Standing foot slides  3 directions nv X12 ea b/l           Leg press                          Ther Activity                                       Gait Training                                       Modalities                                            "

## 2024-11-07 ENCOUNTER — OFFICE VISIT (OUTPATIENT)
Age: 54
End: 2024-11-07
Payer: MEDICARE

## 2024-11-07 DIAGNOSIS — Z74.09 IMPAIRED FUNCTIONAL MOBILITY, BALANCE, GAIT, AND ENDURANCE: ICD-10-CM

## 2024-11-07 DIAGNOSIS — R29.898 WEAKNESS OF RIGHT HIP: ICD-10-CM

## 2024-11-07 DIAGNOSIS — M25.551 RIGHT HIP PAIN: Primary | ICD-10-CM

## 2024-11-07 DIAGNOSIS — Z09 S/P ORTHOPEDIC SURGERY, FOLLOW-UP EXAM: ICD-10-CM

## 2024-11-07 PROCEDURE — 97110 THERAPEUTIC EXERCISES: CPT

## 2024-11-07 PROCEDURE — 97112 NEUROMUSCULAR REEDUCATION: CPT

## 2024-11-07 NOTE — PROGRESS NOTES
Daily Note     Today's date: 2024  Patient name: Em Koo  : 1970  MRN: [id number]  Referring provider: Zuly Nichole, *  Dx:   Encounter Diagnosis     ICD-10-CM    1. Right hip pain  M25.551       2. S/P orthopedic surgery, follow-up exam  Z09       3. Impaired functional mobility, balance, gait, and endurance  Z74.09       4. Weakness of right hip  R29.898           Start Time: 1545  Stop Time: 1630  Total time in clinic (min): 45 minutes    Subjective: Pt reports 4/10 pain in the R groin and lateral aspect of her R hip. Pt reports she tried to do the seated marches as part of her HEP but noted increased groin pain and a burning sensation. Pt reports she used ice and rest to decrease her symptoms.       Objective: See treatment diary below      Assessment: Pt performed Nustep aerobic exercises to increase blood flow to the area being treated, prepare the muscles for strength training and stretching, improve overall tolerance to activity, and aerobic endurance.  When performing standing heel slide pt required using about 50% UE support for balance. Next session will try to transition to only using 1 hand for standing balance to improve pt confidence and overall LE strength. Pt did well with STS today, did require b/l UE support on the chair to stand. Pt reported a good stretch in her iliopsoas and quad with supine quad stretch . Will provide pt with updated HEP next session. Pt continues to benefit from physical therapy in order to continue progressing towards goals as outlined and return to PLOF. Will continue to modify and advance current POC based on overall progress and symptom irritability.       Plan: Continue per plan of care.  Progress treatment as tolerated.       Precautions: standard precautions, anxiety, chronic paranoid schizophrenia       Date:           Visit #: 1 2 3 4 5 6 7 8 9 10   Manuals             PROM/ stretching nv nv           Hip mobs (distraction/  "ant/ post) nv nv           STM/ IASTM             Scar massage nv            Manual resistance nv nv           Quad stretch    3x30\" holds ea           Neuro Re-Ed             EDU/ HEP EDU on HEP, ice/ heat, signs/ symptoms of infection, and anatomy of condition RR RR          Quad set nv nv           LAQ  10x5\"holds ea           Heel slides nv 10x5\" holds ea            Hip ABD slides nv nv           Seated marches nv X10 ea b/l           STS nv nv X10 from standard height chair with b/l UE support          Step ups   nv          Step downs             4 way hip  nv nv                                    Ther Ex             Nustep nv 10' L3 10' L5          bike   nv          Hip ABD PTB 10x5\" holds ea PTB 2' x10\" holds PTB 10x10\" holds ea          Hip ADD 10x5\" holds ea 2' x10\" holds ea 10x10\" holds ea           Standing marches   X10 ea 2 HHA at walker X10 ea 2 HHA at railing          Standing foot slides  3 directions nv X12 ea b/l X12 ea b/l 2 HHA 1 HHA         Leg press                          Ther Activity                                       Gait Training                                       Modalities                                              "

## 2024-11-11 ENCOUNTER — OFFICE VISIT (OUTPATIENT)
Age: 54
End: 2024-11-11
Payer: MEDICARE

## 2024-11-11 DIAGNOSIS — Z09 S/P ORTHOPEDIC SURGERY, FOLLOW-UP EXAM: ICD-10-CM

## 2024-11-11 DIAGNOSIS — M25.551 RIGHT HIP PAIN: Primary | ICD-10-CM

## 2024-11-11 DIAGNOSIS — Z74.09 IMPAIRED FUNCTIONAL MOBILITY, BALANCE, GAIT, AND ENDURANCE: ICD-10-CM

## 2024-11-11 DIAGNOSIS — R29.898 WEAKNESS OF RIGHT HIP: ICD-10-CM

## 2024-11-11 PROCEDURE — 97110 THERAPEUTIC EXERCISES: CPT

## 2024-11-11 PROCEDURE — 97140 MANUAL THERAPY 1/> REGIONS: CPT

## 2024-11-11 PROCEDURE — 97112 NEUROMUSCULAR REEDUCATION: CPT

## 2024-11-11 NOTE — PROGRESS NOTES
Daily Note     Today's date: 2024  Patient name: Em Koo  : 1970  MRN: [id number]  Referring provider: Zuly Nichole, *  Dx:   Encounter Diagnosis     ICD-10-CM    1. Right hip pain  M25.551       2. S/P orthopedic surgery, follow-up exam  Z09       3. Impaired functional mobility, balance, gait, and endurance  Z74.09       4. Weakness of right hip  R29.898           Start Time: 1615  Stop Time: 1700  Total time in clinic (min): 45 minutes    Subjective: Pt denies pain and reports feeling good following last session.      Objective: See treatment diary below      Assessment: Pt performed  X-ride  aerobic exercises to increase blood flow to the area being treated, prepare the muscles for strength training and stretching, improve overall tolerance to activity, and aerobic endurance. Pt did well with program as outlined today without any groin pain noted. Pt was able to progress to 3 way hip exercise with 2 HHA, pt had mild difficulty trusting weight shift onto her R LE. With introduction to step ups pt required close supervision and heavy verbal cueing from therapist for weight shift onto the R LE and stepping up onto the step. Pt had greater difficulty with ascending the stair case leading with the R LE. For the first few repetitions pt required finger tip assist at the ankle to initiate SLR. Finished session with gentle ROM and mobilizations of the hip igor improve mobility and for pain management. Pt left session fatigued without increase in pain. Will provide pt with updated HEP next session. Pt continues to benefit from physical therapy in order to continue progressing towards goals as outlined and return to PLOF. Will continue to modify and advance current POC based on overall progress and symptom irritability.       Plan: Continue per plan of care.  Progress treatment as tolerated.       Precautions: standard precautions, anxiety, chronic paranoid schizophrenia       Date:   "11/7 11/11         Visit #: 1 2 3 4 5 6 7 8 9 10   Manuals             PROM/ stretching nv nv  RR         Hip mobs (distraction/ ant/ post) nv nv  RR grade 2-3         STM/ IASTM             Scar massage nv            Manual resistance nv nv  nv         Quad stretch    3x30\" holds ea           Neuro Re-Ed             EDU/ HEP EDU on HEP, ice/ heat, signs/ symptoms of infection, and anatomy of condition RR RR RR         Quad set nv nv           LAQ  10x5\"holds ea           SLR    X5 (finger tip assist @ ankle from therapist for first 2 reps)         Heel slides nv 10x5\" holds ea            Hip ABD slides nv nv  X15          Seated marches nv X10 ea b/l           STS nv nv X10 from standard height chair with b/l UE support X10 from standard height chair with b/l UE support         Step ups   nv 3\" step 2 HHA x7 R LE> x10 L LE (VC from therapist for weight shift and glute set)         Step downs             4 way hip  nv nv Unable to lay on the R LE                                    Ther Ex             Nustep nv 10' L3 10' L5          bike   nv unable         X-ride    10'          Hip ABD PTB 10x5\" holds ea PTB 2' x10\" holds PTB 10x10\" holds ea          Hip ADD 10x5\" holds ea 2' x10\" holds ea 10x10\" holds ea           Standing marches   X10 ea 2 HHA at walker X10 ea 2 HHA at railing X15 ea 2 HHA at railing         Standing foot slides  3 directions nv X12 ea b/l X12 ea b/l 2 HHA          3 way hip    X10 ea 2 HHA         Leg press    nv                      Ther Activity                                       Gait Training                                       Modalities                                                "

## 2024-11-14 ENCOUNTER — OFFICE VISIT (OUTPATIENT)
Age: 54
End: 2024-11-14
Payer: MEDICARE

## 2024-11-14 DIAGNOSIS — Z74.09 IMPAIRED FUNCTIONAL MOBILITY, BALANCE, GAIT, AND ENDURANCE: ICD-10-CM

## 2024-11-14 DIAGNOSIS — M25.551 RIGHT HIP PAIN: Primary | ICD-10-CM

## 2024-11-14 DIAGNOSIS — R29.898 WEAKNESS OF RIGHT HIP: ICD-10-CM

## 2024-11-14 DIAGNOSIS — Z09 S/P ORTHOPEDIC SURGERY, FOLLOW-UP EXAM: ICD-10-CM

## 2024-11-14 PROCEDURE — 97110 THERAPEUTIC EXERCISES: CPT

## 2024-11-14 PROCEDURE — 97112 NEUROMUSCULAR REEDUCATION: CPT

## 2024-11-14 PROCEDURE — 97140 MANUAL THERAPY 1/> REGIONS: CPT

## 2024-11-14 NOTE — PROGRESS NOTES
Daily Note     Today's date: 2024  Patient name: Em Koo  : 1970  MRN: [id number]  Referring provider: Zuly Nichole, *  Dx:   Encounter Diagnosis     ICD-10-CM    1. Right hip pain  M25.551       2. S/P orthopedic surgery, follow-up exam  Z09       3. Impaired functional mobility, balance, gait, and endurance  Z74.09       4. Weakness of right hip  R29.898           Start Time: 1530  Stop Time: 1615  Total time in clinic (min): 45 minutes    Subjective: Pt reports that she has 5/10 pain today in the R groin. Pt reports that she feels stiff/ sore since completing updated HEP.       Objective: See treatment diary below      Assessment: Pt performed  X-ride  aerobic exercises to increase blood flow to the area being treated, prepare the muscles for strength training and stretching, improve overall tolerance to activity, and aerobic endurance. Performed session mostly supine on the table to address quad strength and gentle strengthening exercises for the hip. Pt was able to perform SLR independently today with excellent concentric and eccentric control, noted muscular fatigue at the end of repetitions. Due to pt report of sensitivity over the surgical incision, introduced densensitization techniques and scar massage. Pt demonstrated an understanding and noted less sensitivity after. Pt left session fatigued but without increase in symptoms. Pt continues to benefit from physical therapy in order to continue progressing towards goals as outlined and return to PLOF. Will continue to modify and advance current POC based on overall progress and symptom irritability.       Plan: Continue per plan of care.  Progress treatment as tolerated.       Precautions: standard precautions, anxiety, chronic paranoid schizophrenia       Date:         Visit #: 1 2 3 4 5 6 7 8 9 10   Manuals             PROM/ stretching nv nv  RR         Hip mobs (distraction/ ant/ post) nv nv  RR grade  "2-3         STM/ IASTM             Scar massage nv    RR        Desensitization     RR        Manual resistance nv nv  nv nv        Quad stretch    3x30\" holds ea           Neuro Re-Ed             EDU/ HEP EDU on HEP, ice/ heat, signs/ symptoms of infection, and anatomy of condition RR RR RR RR        Quad set nv nv   15x5\" holds ea         SAQ     15x5\" holds ea         LAQ  10x5\"holds ea           SLR    X5 (finger tip assist @ ankle from therapist for first 2 reps) X5         Heel slides nv 10x5\" holds ea            Hip ABD slides nv nv  X15          Seated marches nv X10 ea b/l   X10 ea b/l        STS nv nv X10 from standard height chair with b/l UE support X10 from standard height chair with b/l UE support nv        Step ups   nv 3\" step 2 HHA x7 R LE> x10 L LE (VC from therapist for weight shift and glute set) nv        Step downs             4 way hip  nv nv Unable to lay on the R LE                                    Ther Ex             Nustep nv 10' L3 10' L5          bike   nv unable         X-ride    10'  10' L1        Hip ABD PTB 10x5\" holds ea PTB 2' x10\" holds PTB 10x10\" holds ea          Hip ADD 10x5\" holds ea 2' x10\" holds ea 10x10\" holds ea           Standing marches   X10 ea 2 HHA at walker X10 ea 2 HHA at railing X15 ea 2 HHA at railing X15 ea 2 HHA at railing        Standing foot slides  3 directions nv X12 ea b/l X12 ea b/l 2 HHA          3 way hip    X10 ea 2 HHA         Leg press    nv nv                     Ther Activity                                       Gait Training                                       Modalities                                                  "

## 2024-11-19 ENCOUNTER — OFFICE VISIT (OUTPATIENT)
Age: 54
End: 2024-11-19
Payer: MEDICARE

## 2024-11-19 DIAGNOSIS — M25.551 RIGHT HIP PAIN: Primary | ICD-10-CM

## 2024-11-19 DIAGNOSIS — Z09 S/P ORTHOPEDIC SURGERY, FOLLOW-UP EXAM: ICD-10-CM

## 2024-11-19 DIAGNOSIS — Z74.09 IMPAIRED FUNCTIONAL MOBILITY, BALANCE, GAIT, AND ENDURANCE: ICD-10-CM

## 2024-11-19 DIAGNOSIS — R29.898 WEAKNESS OF RIGHT HIP: ICD-10-CM

## 2024-11-19 PROCEDURE — 97110 THERAPEUTIC EXERCISES: CPT | Performed by: PHYSICAL THERAPIST

## 2024-11-19 PROCEDURE — 97112 NEUROMUSCULAR REEDUCATION: CPT | Performed by: PHYSICAL THERAPIST

## 2024-11-19 NOTE — PROGRESS NOTES
"Daily Note     Today's date: 2024  Patient name: Em Koo  : 1970  MRN: [id number]  Referring provider: Zuly Nichole, *  Dx:   Encounter Diagnosis     ICD-10-CM    1. Right hip pain  M25.551       2. S/P orthopedic surgery, follow-up exam  Z09       3. Impaired functional mobility, balance, gait, and endurance  Z74.09       4. Weakness of right hip  R29.898           Start Time: 1700  Stop Time: 1745  Total time in clinic (min): 45 minutes    Subjective: Patient noted that she has some burning pain with seated hip flexion.       Objective: See treatment diary below      Assessment: Patient performed X-ride  aerobic exercise to increase blood flow to the area being treated, prepare the muscles for strength training and stretching, improve overall tolerance to activity, and aerobic endurance. Patient performed sit to stands with HHA at times. Patient negotiated steps with 2 HHA and improved with confidence as reps increased. Patient noted some discomfort with seated and standing marching. PT educated the patient on her HEP. Patient would benefit from continued PT to allow the patient to return to her PLOF.         Plan: Continue per plan of care.      Precautions: standard precautions, anxiety, chronic paranoid schizophrenia       Date:        Visit #: 1 2 3 4 5 6 7 8 9 10   Manuals             PROM/ stretching nv nv  RR         Hip mobs (distraction/ ant/ post) nv nv  RR grade 2-3         STM/ IASTM             Scar massage nv    RR        Desensitization     RR        Manual resistance nv nv  nv nv        Quad stretch    3x30\" holds ea           Neuro Re-Ed             EDU/ HEP EDU on HEP, ice/ heat, signs/ symptoms of infection, and anatomy of condition RR RR RR RR MW       Quad set nv nv   15x5\" holds ea  15x5\" hold ea       SAQ     15x5\" holds ea  15x5\" hold ea       LAQ  10x5\"holds ea           SLR    X5 (finger tip assist @ ankle from therapist for " "first 2 reps) X5  X5 R       Heel slides nv 10x5\" holds ea            Hip ABD slides nv nv  X15          Seated marches nv X10 ea b/l   X10 ea b/l 2x5 ea b/l       STS nv nv X10 from standard height chair with b/l UE support X10 from standard height chair with b/l UE support nv X10        Step ups   nv 3\" step 2 HHA x7 R LE> x10 L LE (VC from therapist for weight shift and glute set) nv 4\"  X10 ea 2 HHA       Step downs             4 way hip  nv nv Unable to lay on the R LE                                    Ther Ex             Nustep nv 10' L3 10' L5          bike   nv unable         X-ride    10'  10' L1 10' lvl 1       Hip ABD PTB 10x5\" holds ea PTB 2' x10\" holds PTB 10x10\" holds ea          Hip ADD 10x5\" holds ea 2' x10\" holds ea 10x10\" holds ea           Standing marches   X10 ea 2 HHA at walker X10 ea 2 HHA at railing X15 ea 2 HHA at railing X15 ea 2 HHA at railing X15 ea 2 HHA at railing       Standing foot slides  3 directions nv X12 ea b/l X12 ea b/l 2 HHA          3 way hip    X10 ea 2 HHA         Leg press    nv nv                     Ther Activity                                       Gait Training                                       Modalities                                                    "

## 2024-11-21 ENCOUNTER — OFFICE VISIT (OUTPATIENT)
Age: 54
End: 2024-11-21
Payer: MEDICARE

## 2024-11-21 DIAGNOSIS — R29.898 WEAKNESS OF RIGHT HIP: ICD-10-CM

## 2024-11-21 DIAGNOSIS — Z09 S/P ORTHOPEDIC SURGERY, FOLLOW-UP EXAM: ICD-10-CM

## 2024-11-21 DIAGNOSIS — M25.551 RIGHT HIP PAIN: Primary | ICD-10-CM

## 2024-11-21 DIAGNOSIS — Z74.09 IMPAIRED FUNCTIONAL MOBILITY, BALANCE, GAIT, AND ENDURANCE: ICD-10-CM

## 2024-11-21 PROCEDURE — 97112 NEUROMUSCULAR REEDUCATION: CPT

## 2024-11-21 PROCEDURE — 97110 THERAPEUTIC EXERCISES: CPT

## 2024-11-21 PROCEDURE — 97530 THERAPEUTIC ACTIVITIES: CPT

## 2024-11-21 NOTE — PROGRESS NOTES
Daily Note     Today's date: 2024  Patient name: Em Koo  : 1970  MRN: [id number]  Referring provider: Zuly Nichole, *  Dx:   Encounter Diagnosis     ICD-10-CM    1. Right hip pain  M25.551       2. S/P orthopedic surgery, follow-up exam  Z09       3. Impaired functional mobility, balance, gait, and endurance  Z74.09       4. Weakness of right hip  R29.898           Start Time: 1530  Stop Time: 1615  Total time in clinic (min): 45 minutes    Subjective: Pt denies any pain today. Pt reports after last session she felt good. Pt continues to report compliance with her HEP and denies any questions or concerns at this time. Pt reports she has a f/u with her surgeon on 24.       Objective: See treatment diary below      Assessment:Pt performed  X-ride  aerobic exercises to increase blood flow to the area being treated, prepare the muscles for strength training and stretching, improve overall tolerance to activity, and aerobic endurance. Pt did excellent with current POC as outlined. Gait trained with SPC, pt demonstrated appropriate gait mechanics and was able to transition to step through gait mechanics. Following use of the leg press pt noted tightness in the R groin. Pt was able to complete 10 repetitions of SLR without pain. Pt noted decrease in groin pain following mobilization of the R hip. Pt left session ambulating with the RW due to muscular fatigue. Pt continues to benefit from physical therapy in order to continue progressing towards goals as outlined and return to PLOF. Will continue to modify and advance current POC based on overall progress and symptom irritability.       Plan: Continue per plan of care.  Progress treatment as tolerated.       Precautions: standard precautions, anxiety, chronic paranoid schizophrenia       Date:       Visit #: 1 2 3 4 5 6 7 8 9 10   Manuals             PROM/ stretching nv nv  RR         Hip mobs  "(distraction/ ant/ post) nv nv  RR grade 2-3   RR grade 2-3      STM/ IASTM             Scar massage nv    RR        Desensitization     RR        Manual resistance nv nv  nv nv        Quad stretch    3x30\" holds ea           Neuro Re-Ed             EDU/ HEP EDU on HEP, ice/ heat, signs/ symptoms of infection, and anatomy of condition RR RR RR RR MW RR      Quad set nv nv   15x5\" holds ea  15x5\" hold ea       SAQ     15x5\" holds ea  15x5\" hold ea       LAQ  10x5\"holds ea           SLR    X5 (finger tip assist @ ankle from therapist for first 2 reps) X5  X5 R X10       Heel slides nv 10x5\" holds ea            Hip ABD slides nv nv  X15          Seated marches nv X10 ea b/l   X10 ea b/l 2x5 ea b/l       STS nv nv X10 from standard height chair with b/l UE support X10 from standard height chair with b/l UE support nv X10  nv      Step ups   nv 3\" step 2 HHA x7 R LE> x10 L LE (VC from therapist for weight shift and glute set) nv 4\"  X10 ea 2 HHA nv      Step downs       nv      4 way hip  nv nv Unable to lay on the R LE                                    Ther Ex             Nustep nv 10' L3 10' L5          bike   nv unable   nv      X-ride    10'  10' L1 10' lvl 1 4' L2 >  6' L3      Hip ABD PTB 10x5\" holds ea PTB 2' x10\" holds PTB 10x10\" holds ea          Hip ADD 10x5\" holds ea 2' x10\" holds ea 10x10\" holds ea           Standing marches   X10 ea 2 HHA at walker X10 ea 2 HHA at railing X15 ea 2 HHA at railing X15 ea 2 HHA at railing X15 ea 2 HHA at railing       Standing foot slides  3 directions nv X12 ea b/l X12 ea b/l 2 HHA          3 way hip    X10 ea 2 HHA   nv      bridges       X10 with quick lift and 3\" lowering      Leg press    nv nv  Plate 7     DL 35# x20    SL 15# x20                   Ther Activity                                       Gait Training             SPC       3 laps step through gait mechanics close supervision                   Modalities                                                      "

## 2024-11-25 ENCOUNTER — OFFICE VISIT (OUTPATIENT)
Age: 54
End: 2024-11-25
Payer: MEDICARE

## 2024-11-25 DIAGNOSIS — M25.551 RIGHT HIP PAIN: Primary | ICD-10-CM

## 2024-11-25 DIAGNOSIS — R29.898 WEAKNESS OF RIGHT HIP: ICD-10-CM

## 2024-11-25 DIAGNOSIS — Z74.09 IMPAIRED FUNCTIONAL MOBILITY, BALANCE, GAIT, AND ENDURANCE: ICD-10-CM

## 2024-11-25 DIAGNOSIS — Z09 S/P ORTHOPEDIC SURGERY, FOLLOW-UP EXAM: ICD-10-CM

## 2024-11-25 PROCEDURE — 97112 NEUROMUSCULAR REEDUCATION: CPT

## 2024-11-25 PROCEDURE — 97110 THERAPEUTIC EXERCISES: CPT

## 2024-11-25 NOTE — PROGRESS NOTES
Daily Note     Today's date: 2024  Patient name: Em Koo  : 1970  MRN: [id number]  Referring provider: Zuly Nichole, *  Dx:   Encounter Diagnosis     ICD-10-CM    1. Right hip pain  M25.551       2. S/P orthopedic surgery, follow-up exam  Z09       3. Impaired functional mobility, balance, gait, and endurance  Z74.09       4. Weakness of right hip  R29.898           Start Time: 1700  Stop Time: 1745  Total time in clinic (min): 45 minutes    Subjective: Pt reports 3/10 pain in the R hip. Pt continues to report compliance with HEP and denies any questions or concerns at this time. Pt reports she had her f/u with her surgeon today and reports he was happy with her x-ray, recommended introducing some back stretches to minimize burning noted in the hip.      Objective: See treatment diary below      Assessment: Pt performed bike aerobic exercises to increase blood flow to the area being treated, prepare the muscles for strength training and stretching, improve overall tolerance to activity, and aerobic endurance. Pt did excellent with current POC as outlined. Pt was able to increase step height and leg press weight without any adverse reactions. Pt was able to lay on her R side today with minimal discomfort noted. Open books and knee fall outs minimized the burning symptoms in the low back. Provided pt with updated HEP and resistance bands. Pt continues to benefit from physical therapy in order to continue progressing towards goals as outlined and return to PLOF. Will continue to modify and advance current POC based on overall progress and symptom irritability.       Plan: Continue per plan of care.  Progress treatment as tolerated.       Precautions: standard precautions, anxiety, chronic paranoid schizophrenia       Date:      Visit #: 1 2 3 4 5 6 7 8 9 10   Manuals             PROM/ stretching nv nv  RR         Hip mobs (distraction/ ant/ post)  "nv nv  RR grade 2-3   RR grade 2-3      STM/ IASTM             Scar massage nv    RR        Desensitization     RR        Manual resistance nv nv  nv nv        Quad stretch    3x30\" holds ea           Neuro Re-Ed             EDU/ HEP EDU on HEP, ice/ heat, signs/ symptoms of infection, and anatomy of condition RR RR RR RR MW RR RR     Quad set nv nv   15x5\" holds ea  15x5\" hold ea       SAQ     15x5\" holds ea  15x5\" hold ea       LAQ  10x5\"holds ea           SLR    X5 (finger tip assist @ ankle from therapist for first 2 reps) X5  X5 R X10       Heel slides nv 10x5\" holds ea            Hip ABD slides nv nv  X15          Seated marches nv X10 ea b/l   X10 ea b/l 2x5 ea b/l       STS nv nv X10 from standard height chair with b/l UE support X10 from standard height chair with b/l UE support nv X10  nv nv     Step ups   nv 3\" step 2 HHA x7 R LE> x10 L LE (VC from therapist for weight shift and glute set) nv 4\"  X10 ea 2 HHA nv 6\" step to 12\" step through  X10 ea 2 HHA      Step downs       nv 3\" step x10 ea 2 HHA     4 way hip  nv nv Unable to lay on the R LE          Open books        10x10\" holds ea b/l     Knee fall outs        10x10\" holds ea b/l                               Ther Ex             Nustep nv 10' L3 10' L5          bike   nv unable   nv 10' L1     X-ride    10'  10' L1 10' lvl 1 4' L2 >  6' L3      Laps around the clinic             Hip ABD PTB 10x5\" holds ea PTB 2' x10\" holds PTB 10x10\" holds ea          Hip ADD 10x5\" holds ea 2' x10\" holds ea 10x10\" holds ea           Standing marches   X10 ea 2 HHA at walker X10 ea 2 HHA at railing X15 ea 2 HHA at railing X15 ea 2 HHA at railing X15 ea 2 HHA at railing       Standing foot slides  3 directions nv X12 ea b/l X12 ea b/l 2 HHA          3 way hip    X10 ea 2 HHA   nv      bridges       X10 with quick lift and 3\" lowering 2x10 with quick lift and 3\" lowering     Leg press    nv nv  Plate 7     DL 35# x20    SL 15# x20 Plate 6    DL 45# x20     SL 25# x20   " "   Hip hikes        10x5\" holds ea                                Ther Activity                                       Gait Training             SPC       3 laps step through gait mechanics close supervision                   Modalities                                                        "

## 2024-11-29 ENCOUNTER — EVALUATION (OUTPATIENT)
Age: 54
End: 2024-11-29
Payer: MEDICARE

## 2024-11-29 DIAGNOSIS — M25.551 RIGHT HIP PAIN: Primary | ICD-10-CM

## 2024-11-29 DIAGNOSIS — R29.898 WEAKNESS OF RIGHT HIP: ICD-10-CM

## 2024-11-29 DIAGNOSIS — Z09 S/P ORTHOPEDIC SURGERY, FOLLOW-UP EXAM: ICD-10-CM

## 2024-11-29 DIAGNOSIS — Z74.09 IMPAIRED FUNCTIONAL MOBILITY, BALANCE, GAIT, AND ENDURANCE: ICD-10-CM

## 2024-11-29 PROCEDURE — 97112 NEUROMUSCULAR REEDUCATION: CPT

## 2024-11-29 PROCEDURE — 97110 THERAPEUTIC EXERCISES: CPT

## 2024-11-29 PROCEDURE — 97164 PT RE-EVAL EST PLAN CARE: CPT

## 2024-11-29 RX ORDER — ALENDRONATE SODIUM 70 MG/75ML
70 SOLUTION ORAL
COMMUNITY

## 2024-11-29 NOTE — PROGRESS NOTES
Hellen PT with [facility] calling.  Pt's BP was elevated today at the visit. When sitting it was 180/79 and when standing it was 167/80.  Pt does not report any symptoms.    Pt has not yet taken her BP pills.     Relayed that pt should take her medications as prescribed and then recheck BP after 30 minutes to an hour.    Routing to provider as an FYI.    Minnie Baker, MAURON, RN  [facility]     PT Re-Evaluation     Today's date: 2024  Patient name: Em Koo  : 1970  MRN: [id number]  Referring provider: Zuly Nichole, *  Dx:   Encounter Diagnosis     ICD-10-CM    1. Right hip pain  M25.551       2. S/P orthopedic surgery, follow-up exam  Z09       3. Impaired functional mobility, balance, gait, and endurance  Z74.09       4. Weakness of right hip  R29.898             Start Time: 730  Stop Time: 815  Total time in clinic (min): 45 minutes    Assessment  Impairments: abnormal gait, abnormal or restricted ROM, activity intolerance, impaired balance, impaired physical strength, pain with function, participation limitations, activity limitations and endurance    Assessment details: Em is a 54 y.o. female who continues to present to outpatient physical therapy s/p R hip fracture and ORIF 10/12/24. Since IE pt has made excellent progress with ROM and  LE strength in all planes. Pt is now ambulating with a SPC utilizing step through gait mechanics. Primary impairments continue to include ROM deficits, strength deficits, decreased tolerance to activity, decreased muscular endurance, impaired balance, and impaired neuromuscular control. They present with the previously stated impairments which limit their ability to get into and out of a low riding car, step into and out of a tub shower, and prolonged standing/ walking without AD/ modifications or increased pain/ discomfort although all have improved since IE. Pt is no longer considered a fall risk based on the time taken to complete the TUG. Will begin progression off of the SPC for shorter distances over the next few sessions. Pt was able to complete the 6 MWT ambulating with the SPC however after about 4 minutes her gait became more antalgic. Em is currently functioning below their prior level of function and is a good rehab candidate who would benefit from skilled outpatient physical therapy services to allow them to reach  their goals and return to PLOF. Pt recommended for 2x a week for 4-6 weeks. Pt prognosis for therapy is good. PT discussed POC and goals with patient, patient was agreeable.      Physical therapist assistant (PTA) may be utilized to administer treatments as appropriate and in accordance with [facility] Physical Therapy Practice Act.      Goals  STG: To be completed in 4 weeks from 11/1/24.   1. Em will be independent with basic HEP to allow patient to complete exercises safely when not directly supervised during PT session. (MET)  2. Em will report pain no worse than 3/10 at worst to indicate decreased pain level and improved participation with activity. (In progress)  3. Em will have improved R hip flexion ROM to 60* and R hip extension ROM to 10* in order to indicate progression to promoting normalized gait pattern. (MET)  4. Em will have improved R LE strength to 3+/5 in all motion to improve quality of ambulation and stair climbing. (MET)      LTG: To be completed in 8 weeks from 11/1/24.   1. Em will have an improved FOTO score of 54 in order to demonstrate decreased pain with activity, improved functional strength, improved participation with activity, and improved QOL. (In progress)  2. Em will report pain no worse than 1/10 at worst to indicate decreased pain level and improved participation with activity. (In progress)  3. Em will report 60% improvement in symptoms compared to start of POC to indicate functional improvement and decrease level of disability.  (MET)  4. Em will be independent with advanced home exercise program to allow patient to transition from physical therapy care to continue with plan of care at home without supervision from therapist and continue to progress with rehabilitation. (MET)  5. Em will tolerate single limb stance for 10 seconds on R leg to allow patient to have increased stance time on R leg during ambulation. (In progress)  6.  "Em will have improved R hip flexion ROM to 120* and R hip extension ROM to 30* in order to indicate normal hip ROM. (In progress)  7. Em will have improved R LE strength to 4/5 in with all motions to improve quality of ambulation, transfers, and stair climbing. (In progress)    New goals as of 11/29/24 to be met in 4-6 weeks or upon discharge from OPPT:  1. Em will be able to increase her distance ambulated during the 6 MWT by 100 feet with no AD vs. LRAD.  2. Em will complete the TUG without AD in 12 seconds or less without any pain.  3. Em will be able to perform 10-12 STS from standard height chair without use of UE support and no increase in pain.  4. Em will be able to perform 10 squats to an 18\" box without loss of balance.       Plan  Patient would benefit from: PT eval and skilled physical therapy  Planned modality interventions: biofeedback, cryotherapy, thermotherapy: hydrocollator packs, ultrasound and unattended electrical stimulation    Planned therapy interventions: balance, balance/weight bearing training, flexibility, functional ROM exercises, gait training, graded exercise, graded activity, graded motor, transfer training, therapeutic exercise, therapeutic activities, stretching, strengthening, patient education, IASTM, joint mobilization, kinesiology taping, manual therapy, massage, Greco taping, neuromuscular re-education, nerve gliding, patient/caregiver education, postural training, activity modification, ADL retraining, ADL training, abdominal trunk stabilization and home exercise program    Frequency: 2x week  Duration in weeks: 6  Plan of Care beginning date: 11/29/2024  Plan of Care expiration date: 1/10/2025  Treatment plan discussed with: patient      Subjective Evaluation    History of Present Illness  Mechanism of injury: Pt reports since starting therapy she is roughly 50-60% improved. Pt reports she is more independent with ambulation and movement around the " "house including getting into and out of the shower and getting into and out of bed. Em reports it is still challenging to remind herself she is able to put weight into her surgical leg and isolate it during exercises. Em is only able to sit for 1 hour, stand for several hours with SPC, and walk for about 1 hour with SPC before onset of symptoms. Pt denies sleep disturbances due to pain. Pt continues to report difficulty with STS, bed mobility, stair negotiation (ascending > descending) requiring b/l UE support, stepping into and out of a tub shower, getting into and out of a low car, however all have significantly improved since initial eval.   Patient Goals  Patient goals for therapy: decreased pain, improved balance, increased strength, independence with ADLs/IADLs, return to sport/leisure activities, return to work and increased motion  Patient goal: \" be able to drive\"(MET), \"be tyrone to walk without an AD\"(in progress), \"be able to walk for about 1.25 miles with her dog\"(in progress), \"be able to run 1 miles\"(in progress)  Pain  Current pain ratin  At best pain ratin  At worst pain ratin (7/10 when exercising; 4/10 on average)  Quality: tight, pressure, burning, sharp, dull ache, radiating, discomfort, pulling and squeezing  Relieving factors: medications, ice, change in position and rest (tylenol 500 mg prn, massage)  Aggravating factors: sitting, standing, walking, stair climbing and running (STS transfers, toilet transfers, transfers from a low car, dressing, getting into and out of the tub shower)  Progression: improved    Social Support  Exterior steps/ramp assessed: 2 RICKY no railing.  Interior stairs assessed: 1 flight of stairs to the second floor and basement.   Lives in: multiple-level home  Lives with: spouse and adult children (dog)    Employment status: working (currently on leave; pt works on a [employer])  Hand dominance: right  Exercise history: run, bike, walking the dog, go " to the gym, swimming        Objective     Active Range of Motion   Left Hip   Normal active range of motion    Right Hip   Flexion: 105 (nerve pain) degrees with pain  Extension: 12 degrees with pain  Abduction: 25 (groin pain) degrees with pain    Passive Range of Motion     Right Hip   Flexion: 105 (nerve pain) degrees with pain  Abduction: 28 (groin) degrees with pain    Strength/Myotome Testing     Left Hip   Planes of Motion   Flexion: 4  Abduction: 4  Adduction: 4+    Right Hip   Planes of Motion   Flexion: 3+  Abduction: 4  Adduction: 4    Left Knee   Flexion: 4+  Extension: 4    Right Knee   Flexion: 4+  Extension: 3+    Left Ankle/Foot   Dorsiflexion: 4+    Right Ankle/Foot   Dorsiflexion: 4+    Ambulation   Weight-Bearing Status   Weight-Bearing Status (Left): full weight bearing   Weight-Bearing Status (Right): full weight-bearing    Assistive device used: single point cane    Ambulation: Level Surfaces   Ambulation with assistive device: independent  Ambulation without assistive device: unable    Ambulation: Stairs   Ascend stairs: independent (with RW)  Pattern: non-reciprocal  Railings: two rails  Descend stairs: independent  Pattern: non-reciprocal  Railings: two rails    Observational Gait   Gait: antalgic   Decreased walking speed, stride length and right stance time.   Right foot contact pattern: heel to toe    Functional Assessment        Comments  RE: 24  - TU seconds from standard height chair without b/l UE support; ambulated with SPC  - 30 STS: x9 from standard height chair without b/l UE support  - 6 MWT: 883 ft; pain 6/10 R low back; RPE 3/10; ambulated with SPC antalgic gait noted around 4 minutes    General Comments:      Hip Comments   Surgical incision is well healed. Pt has been performing scar massage and desensitization techniques.               Precautions: standard precautions, anxiety, chronic paranoid schizophrenia       Date:   "11/25 11/29    Visit #: 1 2 3 4 5 6 7 8 9 10   Manuals             PROM/ stretching nv nv  RR         Hip mobs (distraction/ ant/ post) nv nv  RR grade 2-3   RR grade 2-3      STM/ IASTM             Scar massage nv    RR        Desensitization     RR        Manual resistance nv nv  nv nv        Quad stretch    3x30\" holds ea           Neuro Re-Ed             EDU/ HEP EDU on HEP, ice/ heat, signs/ symptoms of infection, and anatomy of condition RR RR RR RR MW RR RR RR    DB         x10    DB+TAC         10x5\" holds ea     Quad set nv nv   15x5\" holds ea  15x5\" hold ea       SAQ     15x5\" holds ea  15x5\" hold ea       LAQ  10x5\"holds ea           SLR    X5 (finger tip assist @ ankle from therapist for first 2 reps) X5  X5 R X10       Heel slides nv 10x5\" holds ea            Hip ABD slides nv nv  X15          Seated marches nv X10 ea b/l   X10 ea b/l 2x5 ea b/l       STS nv nv X10 from standard height chair with b/l UE support X10 from standard height chair with b/l UE support nv X10  nv nv 30 STS x9 from standard height chair no UE support    Step ups   nv 3\" step 2 HHA x7 R LE> x10 L LE (VC from therapist for weight shift and glute set) nv 4\"  X10 ea 2 HHA nv 6\" step to 12\" step through  X10 ea 2 HHA      Step downs       nv 3\" step x10 ea 2 HHA     4 way hip  nv nv Unable to lay on the R LE          Open books        10x10\" holds ea b/l 10x10\" holds ea b/l    Knee fall outs        10x10\" holds ea b/l 10x10\" holds ea b/l                              Ther Ex             Nustep nv 10' L3 10' L5          bike   nv unable   nv 10' L1 10' L1 L2   X-ride    10'  10' L1 10' lvl 1 4' L2 >  6' L3      Laps around the clinic             6 MWT         883 feet with SPC    TUG         14 seconds SPC    Hip ABD PTB 10x5\" holds ea PTB 2' x10\" holds PTB 10x10\" holds ea          Hip ADD 10x5\" holds ea 2' x10\" holds ea 10x10\" holds ea           Standing marches   X10 ea 2 HHA at walker X10 ea 2 HHA at railing X15 ea 2 HHA at railing " "X15 ea 2 HHA at railing X15 ea 2 HHA at railing       Standing foot slides  3 directions nv X12 ea b/l X12 ea b/l 2 HHA          3 way hip    X10 ea 2 HHA   nv      bridges       X10 with quick lift and 3\" lowering 2x10 with quick lift and 3\" lowering     Leg press    nv nv  Plate 7     DL 35# x20    SL 15# x20 Plate 6    DL 45# x20     SL 25# x20      Hip hikes        10x5\" holds ea                                Ther Activity                                       Gait Training             SPC       3 laps step through gait mechanics close supervision                   Modalities                                                    "

## 2024-11-29 NOTE — LETTER
2024    Zuly Nichole PA-C  [address]  [facility] [address]    Patient: Em Koo   YOB: 1970   Date of Visit: 2024     Encounter Diagnosis     ICD-10-CM    1. Right hip pain  M25.551       2. S/P orthopedic surgery, follow-up exam  Z09       3. Impaired functional mobility, balance, gait, and endurance  Z74.09       4. Weakness of right hip  R29.898           Dear Dr. Nichole:    Thank you for your recent referral of Em Koo. Please review the attached evaluation summary from Em's recent visit.     Please verify that you agree with the plan of care by signing the attached order.     If you have any questions or concerns, please do not hesitate to call.     I sincerely appreciate the opportunity to share in the care of one of your patients and hope to have another opportunity to work with you in the near future.       Sincerely,    Cherie Monterroso, PT      Referring Provider:      I certify that I have read the below Plan of Care and certify the need for these services furnished under this plan of treatment while under my care.                    Zuly Nichole PA-C  [address]  [place] [address]  Via Fax: [phone]          PT Evaluation     Today's date: 2024  Patient name: Em Koo  : 1970  MRN: [id number]  Referring provider: Zuly Nichole, *  Dx:   Encounter Diagnosis     ICD-10-CM    1. Right hip pain  M25.551       2. S/P orthopedic surgery, follow-up exam  Z09       3. Impaired functional mobility, balance, gait, and endurance  Z74.09       4. Weakness of right hip  R29.898             Start Time: 730  Stop Time: 815  Total time in clinic (min): 45 minutes    Assessment  Impairments: abnormal gait, abnormal or restricted ROM, activity intolerance, impaired balance, impaired physical strength, pain with function, participation limitations, activity limitations and endurance    Assessment  details: Em is a 54 y.o. female who continues to present to outpatient physical therapy s/p R hip fracture and ORIF 10/12/24. Since IE pt has made excellent progress with ROM and  LE strength in all planes. Pt is now ambulating with a SPC utilizing step through gait mechanics. Primary impairments continue to include ROM deficits, strength deficits, decreased tolerance to activity, decreased muscular endurance, impaired balance, and impaired neuromuscular control. They present with the previously stated impairments which limit their ability to get into and out of a low riding car, step into and out of a tub shower, and prolonged standing/ walking without AD/ modifications or increased pain/ discomfort although all have improved since IE. Pt is no longer considered a fall risk based on the time taken to complete the TUG. Will begin progression off of the SPC for shorter distances over the next few sessions. Pt was able to complete the 6 MWT ambulating with the SPC however after about 4 minutes her gait became more antalgic. Em is currently functioning below their prior level of function and is a good rehab candidate who would benefit from skilled outpatient physical therapy services to allow them to reach their goals and return to PLOF. Pt recommended for 2x a week for 4-6 weeks. Pt prognosis for therapy is good. PT discussed POC and goals with patient, patient was agreeable.      Physical therapist assistant (PTA) may be utilized to administer treatments as appropriate and in accordance with [facility] Physical Therapy Practice Act.      Goals  STG: To be completed in 4 weeks from 11/1/24.   1. Em will be independent with basic HEP to allow patient to complete exercises safely when not directly supervised during PT session. (MET)  2. Em will report pain no worse than 3/10 at worst to indicate decreased pain level and improved participation with activity. (In progress)  3. Em will have  improved R hip flexion ROM to 60* and R hip extension ROM to 10* in order to indicate progression to promoting normalized gait pattern. (MET)  4. Em will have improved R LE strength to 3+/5 in all motion to improve quality of ambulation and stair climbing. (MET)      LTG: To be completed in 8 weeks from 11/1/24.   1. Em will have an improved FOTO score of 54 in order to demonstrate decreased pain with activity, improved functional strength, improved participation with activity, and improved QOL. (In progress)  2. Em will report pain no worse than 1/10 at worst to indicate decreased pain level and improved participation with activity. (In progress)  3. Em will report 60% improvement in symptoms compared to start of POC to indicate functional improvement and decrease level of disability.  (MET)  4. Em will be independent with advanced home exercise program to allow patient to transition from physical therapy care to continue with plan of care at home without supervision from therapist and continue to progress with rehabilitation. (MET)  5. Em will tolerate single limb stance for 10 seconds on R leg to allow patient to have increased stance time on R leg during ambulation. (In progress)  6. Em will have improved R hip flexion ROM to 120* and R hip extension ROM to 30* in order to indicate normal hip ROM. (In progress)  7. Em will have improved R LE strength to 4/5 in with all motions to improve quality of ambulation, transfers, and stair climbing. (In progress)    New goals as of 11/29/24 to be met in 4-6 weeks or upon discharge from OPPT:  1. Em will be able to increase her distance ambulated during the 6 MWT by 100 feet with no AD vs. LRAD.  2. Em will complete the TUG without AD in 12 seconds or less without any pain.  3. Em will be able to perform 10-12 STS from standard height chair without use of UE support and no increase in pain.  4. Em will be able to perform 10  "squats to an 18\" box without loss of balance.       Plan  Patient would benefit from: PT eval and skilled physical therapy  Planned modality interventions: biofeedback, cryotherapy, thermotherapy: hydrocollator packs, ultrasound and unattended electrical stimulation    Planned therapy interventions: balance, balance/weight bearing training, flexibility, functional ROM exercises, gait training, graded exercise, graded activity, graded motor, transfer training, therapeutic exercise, therapeutic activities, stretching, strengthening, patient education, IASTM, joint mobilization, kinesiology taping, manual therapy, massage, Greco taping, neuromuscular re-education, nerve gliding, patient/caregiver education, postural training, activity modification, ADL retraining, ADL training, abdominal trunk stabilization and home exercise program    Frequency: 2x week  Duration in weeks: 6  Plan of Care beginning date: 11/29/2024  Plan of Care expiration date: 1/10/2025  Treatment plan discussed with: patient      Subjective Evaluation    History of Present Illness  Mechanism of injury: Pt reports since starting therapy she is roughly 50-60% improved. Pt reports she is more independent with ambulation and movement around the house including getting into and out of the shower and getting into and out of bed. Em reports it is still challenging to remind herself she is able to put weight into her surgical leg and isolate it during exercises. Em is only able to sit for 1 hour, stand for several hours with SPC, and walk for about 1 hour with SPC before onset of symptoms. Pt denies sleep disturbances due to pain. Pt continues to report difficulty with STS, bed mobility, stair negotiation (ascending > descending) requiring b/l UE support, stepping into and out of a tub shower, getting into and out of a low car, however all have significantly improved since initial eval.   Patient Goals  Patient goals for therapy: decreased " "pain, improved balance, increased strength, independence with ADLs/IADLs, return to sport/leisure activities, return to work and increased motion  Patient goal: \" be able to drive\"(MET), \"be tyrone to walk without an AD\"(in progress), \"be able to walk for about 1.25 miles with her dog\"(in progress), \"be able to run 1 miles\"(in progress)  Pain  Current pain ratin  At best pain ratin  At worst pain ratin (7/10 when exercising; 4/10 on average)  Quality: tight, pressure, burning, sharp, dull ache, radiating, discomfort, pulling and squeezing  Relieving factors: medications, ice, change in position and rest (tylenol 500 mg prn, massage)  Aggravating factors: sitting, standing, walking, stair climbing and running (STS transfers, toilet transfers, transfers from a low car, dressing, getting into and out of the tub shower)  Progression: improved    Social Support  Exterior steps/ramp assessed: 2 RICKY no railing.  Interior stairs assessed: 1 flight of stairs to the second floor and basement.   Lives in: multiple-level home  Lives with: spouse and adult children (dog)    Employment status: working (currently on leave; pt works on a [employer])  Hand dominance: right  Exercise history: run, bike, walking the dog, go to the gym, swimming        Objective     Active Range of Motion   Left Hip   Normal active range of motion    Right Hip   Flexion: 105 (nerve pain) degrees with pain  Extension: 12 degrees with pain  Abduction: 25 (groin pain) degrees with pain    Passive Range of Motion     Right Hip   Flexion: 105 (nerve pain) degrees with pain  Abduction: 28 (groin) degrees with pain    Strength/Myotome Testing     Left Hip   Planes of Motion   Flexion: 4  Abduction: 4  Adduction: 4+    Right Hip   Planes of Motion   Flexion: 3+  Abduction: 4  Adduction: 4    Left Knee   Flexion: 4+  Extension: 4    Right Knee   Flexion: 4+  Extension: 3+    Left Ankle/Foot   Dorsiflexion: 4+    Right Ankle/Foot   Dorsiflexion: " "4+    Ambulation   Weight-Bearing Status   Weight-Bearing Status (Left): full weight bearing   Weight-Bearing Status (Right): full weight-bearing    Assistive device used: single point cane    Ambulation: Level Surfaces   Ambulation with assistive device: independent  Ambulation without assistive device: unable    Ambulation: Stairs   Ascend stairs: independent (with RW)  Pattern: non-reciprocal  Railings: two rails  Descend stairs: independent  Pattern: non-reciprocal  Railings: two rails    Observational Gait   Gait: antalgic   Decreased walking speed, stride length and right stance time.   Right foot contact pattern: heel to toe    Functional Assessment        Comments  RE: 24  - TU seconds from standard height chair without b/l UE support; ambulated with SPC  - 30 STS: x9 from standard height chair without b/l UE support  - 6 MWT: 883 ft; pain 6/10 R low back; RPE 3/10; ambulated with SPC antalgic gait noted around 4 minutes    General Comments:      Hip Comments   Surgical incision is well healed. Pt has been performing scar massage and desensitization techniques.               Precautions: standard precautions, anxiety, chronic paranoid schizophrenia       Date:     Visit #: 1 2 3 4 5 6 7 8 9 10   Manuals             PROM/ stretching nv nv  RR         Hip mobs (distraction/ ant/ post) nv nv  RR grade 2-3   RR grade 2-3      STM/ IASTM             Scar massage nv    RR        Desensitization     RR        Manual resistance nv nv  nv nv        Quad stretch    3x30\" holds ea           Neuro Re-Ed             EDU/ HEP EDU on HEP, ice/ heat, signs/ symptoms of infection, and anatomy of condition RR RR RR RR MW RR RR RR    DB         x10    DB+TAC         10x5\" holds ea     Quad set nv nv   15x5\" holds ea  15x5\" hold ea       SAQ     15x5\" holds ea  15x5\" hold ea       LAQ  10x5\"holds ea           SLR    X5 (finger tip assist @ ankle from therapist for " "first 2 reps) X5  X5 R X10       Heel slides nv 10x5\" holds ea            Hip ABD slides nv nv  X15          Seated marches nv X10 ea b/l   X10 ea b/l 2x5 ea b/l       STS nv nv X10 from standard height chair with b/l UE support X10 from standard height chair with b/l UE support nv X10  nv nv 30 STS x9 from standard height chair no UE support    Step ups   nv 3\" step 2 HHA x7 R LE> x10 L LE (VC from therapist for weight shift and glute set) nv 4\"  X10 ea 2 HHA nv 6\" step to 12\" step through  X10 ea 2 HHA      Step downs       nv 3\" step x10 ea 2 HHA     4 way hip  nv nv Unable to lay on the R LE          Open books        10x10\" holds ea b/l 10x10\" holds ea b/l    Knee fall outs        10x10\" holds ea b/l 10x10\" holds ea b/l                              Ther Ex             Nustep nv 10' L3 10' L5          bike   nv unable   nv 10' L1 10' L1 L2   X-ride    10'  10' L1 10' lvl 1 4' L2 >  6' L3      Laps around the clinic             6 MWT         883 feet with SPC    TUG         14 seconds SPC    Hip ABD PTB 10x5\" holds ea PTB 2' x10\" holds PTB 10x10\" holds ea          Hip ADD 10x5\" holds ea 2' x10\" holds ea 10x10\" holds ea           Standing marches   X10 ea 2 HHA at walker X10 ea 2 HHA at railing X15 ea 2 HHA at railing X15 ea 2 HHA at railing X15 ea 2 HHA at railing       Standing foot slides  3 directions nv X12 ea b/l X12 ea b/l 2 HHA          3 way hip    X10 ea 2 HHA   nv      bridges       X10 with quick lift and 3\" lowering 2x10 with quick lift and 3\" lowering     Leg press    nv nv  Plate 7     DL 35# x20    SL 15# x20 Plate 6    DL 45# x20     SL 25# x20      Hip hikes        10x5\" holds ea                                Ther Activity                                       Gait Training             SPC       3 laps step through gait mechanics close supervision                   Modalities                                                                     "

## 2024-12-02 ENCOUNTER — OFFICE VISIT (OUTPATIENT)
Age: 54
End: 2024-12-02
Payer: MEDICARE

## 2024-12-02 DIAGNOSIS — Z74.09 IMPAIRED FUNCTIONAL MOBILITY, BALANCE, GAIT, AND ENDURANCE: ICD-10-CM

## 2024-12-02 DIAGNOSIS — R29.898 WEAKNESS OF RIGHT HIP: ICD-10-CM

## 2024-12-02 DIAGNOSIS — Z09 S/P ORTHOPEDIC SURGERY, FOLLOW-UP EXAM: ICD-10-CM

## 2024-12-02 DIAGNOSIS — M25.551 RIGHT HIP PAIN: Primary | ICD-10-CM

## 2024-12-02 PROCEDURE — 97110 THERAPEUTIC EXERCISES: CPT | Performed by: PHYSICAL THERAPIST

## 2024-12-02 PROCEDURE — 97112 NEUROMUSCULAR REEDUCATION: CPT | Performed by: PHYSICAL THERAPIST

## 2024-12-02 NOTE — PROGRESS NOTES
"Daily Note     Today's date: 2024  Patient name: Em Koo  : 1970  MRN: [id number]  Referring provider: Zuly Nichole, *  Dx:   Encounter Diagnosis     ICD-10-CM    1. Right hip pain  M25.551       2. S/P orthopedic surgery, follow-up exam  Z09       3. Impaired functional mobility, balance, gait, and endurance  Z74.09       4. Weakness of right hip  R29.898                      Subjective: Patient reports the hip is getting better but she does have some IT band discomfort pre-treatment.      Objective: See treatment diary below      Assessment: Pt performed bike aerobic exercise to increase blood flow to the area being treated, prepare the muscles for strength training and stretching, improve overall tolerance to activity, and aerobic endurance. Continued with program as outlined below. She demonstrates good technique throughout the session with no reports of increased symptoms. She would benefit from continuing physical therapy.       Plan: Continue per plan of care.      Precautions: standard precautions, anxiety, chronic paranoid schizophrenia         Date:    Visit #: 1 2 3 4 5 6 7 8 9 10   Manuals             PROM/ stretching nv nv  RR         Hip mobs (distraction/ ant/ post) nv nv  RR grade 2-3   RR grade 2-3      STM/ IASTM             Scar massage nv    RR        Desensitization     RR        Manual resistance nv nv  nv nv        Quad stretch    3x30\" holds ea           Neuro Re-Ed             EDU/ HEP EDU on HEP, ice/ heat, signs/ symptoms of infection, and anatomy of condition RR RR RR RR MW RR RR RR    DB         x10    DB+TAC         10x5\" holds ea     Quad set nv nv   15x5\" holds ea  15x5\" hold ea       SAQ     15x5\" holds ea  15x5\" hold ea       LAQ  10x5\"holds ea           SLR    X5 (finger tip assist @ ankle from therapist for first 2 reps) X5  X5 R X10    x10   Heel slides nv 10x5\" holds ea            Hip ABD slides " "nv nv  X15          Seated marches nv X10 ea b/l   X10 ea b/l 2x5 ea b/l       STS nv nv X10 from standard height chair with b/l UE support X10 from standard height chair with b/l UE support nv X10  nv nv 30 STS x9 from standard height chair no UE support X10 no HHA   Step ups   nv 3\" step 2 HHA x7 R LE> x10 L LE (VC from therapist for weight shift and glute set) nv 4\"  X10 ea 2 HHA nv 6\" step to 12\" step through  X10 ea 2 HHA   6\" step to 12\" step through  X10 ea 2 HHA   Step downs       nv 3\" step x10 ea 2 HHA  3\" x10 ea 2HHA   4 way hip  nv nv Unable to lay on the R LE          Open books        10x10\" holds ea b/l 10x10\" holds ea b/l 10x10\" ea   Knee fall outs        10x10\" holds ea b/l 10x10\" holds ea b/l 10x10\" ea                             Ther Ex             Nustep nv 10' L3 10' L5          bike   nv unable   nv 10' L1 10' L1 10' L2   X-ride    10'  10' L1 10' lvl 1 4' L2 >  6' L3      Laps around the clinic             6 MWT         883 feet with SPC    TUG         14 seconds SPC    Hip ABD PTB 10x5\" holds ea PTB 2' x10\" holds PTB 10x10\" holds ea          Hip ADD 10x5\" holds ea 2' x10\" holds ea 10x10\" holds ea           Standing marches   X10 ea 2 HHA at walker X10 ea 2 HHA at railing X15 ea 2 HHA at railing X15 ea 2 HHA at railing X15 ea 2 HHA at railing       Standing foot slides  3 directions nv X12 ea b/l X12 ea b/l 2 HHA          3 way hip    X10 ea 2 HHA   nv      bridges       X10 with quick lift and 3\" lowering 2x10 with quick lift and 3\" lowering  2x10 with quick lift and 3\" lowering   Leg press    nv nv  Plate 7     DL 35# x20    SL 15# x20 Plate 6    DL 45# x20     SL 25# x20   Plate 6    DL 45# x20     SL 25# x20    Hip hikes        10x5\" holds ea   10x5\" ea                             Ther Activity                                       Gait Training             SPC       3 laps step through gait mechanics close supervision                   Modalities                                          "

## 2024-12-05 ENCOUNTER — APPOINTMENT (OUTPATIENT)
Age: 54
End: 2024-12-05
Payer: MEDICARE

## 2024-12-09 ENCOUNTER — OFFICE VISIT (OUTPATIENT)
Age: 54
End: 2024-12-09
Payer: MEDICARE

## 2024-12-09 DIAGNOSIS — Z74.09 IMPAIRED FUNCTIONAL MOBILITY, BALANCE, GAIT, AND ENDURANCE: ICD-10-CM

## 2024-12-09 DIAGNOSIS — Z09 S/P ORTHOPEDIC SURGERY, FOLLOW-UP EXAM: ICD-10-CM

## 2024-12-09 DIAGNOSIS — M25.551 RIGHT HIP PAIN: Primary | ICD-10-CM

## 2024-12-09 DIAGNOSIS — R29.898 WEAKNESS OF RIGHT HIP: ICD-10-CM

## 2024-12-09 PROCEDURE — 97110 THERAPEUTIC EXERCISES: CPT

## 2024-12-09 PROCEDURE — 97112 NEUROMUSCULAR REEDUCATION: CPT

## 2024-12-09 NOTE — PROGRESS NOTES
"Daily Note     Today's date: 2024  Patient name: Em Koo  : 1970  MRN: [id number]  Referring provider: Zuly Nichole, *  Dx:   Encounter Diagnosis     ICD-10-CM    1. Right hip pain  M25.551       2. S/P orthopedic surgery, follow-up exam  Z09       3. Impaired functional mobility, balance, gait, and endurance  Z74.09       4. Weakness of right hip  R29.898           Start Time: 1745  Stop Time:   Total time in clinic (min): 40 minutes    Subjective: Pt denies pain and reports feeling good following her last session. Pt continues to report compliance with HEP and denies any questions or concerns at this time.      Objective: See treatment diary below      Assessment: Pt performed bike aerobic exercises to increase blood flow to the area being treated, prepare the muscles for strength training and stretching, improve overall tolerance to activity, and aerobic endurance. Addition of unstable surface during 3 way hip exercises fatigued  the patient appropriately. Pt had difficulty with side stepping and diagonal walking against resistance, diagonal walking caused mild groin pain. Pt was able to perform step ups on the 8 inch step with about 60% UE support, fatiguing to failure after 5 repetitions requiring regression to the 6\" step. Pt left session fatigued appropriately. Pt continues to benefit from physical therapy in order to continue progressing towards goals as outlined and return to PLOF. Will continue to modify and advance current POC based on overall progress and symptom irritability.       Plan: Continue per plan of care.  Progress treatment as tolerated.       Precautions: standard precautions, anxiety, chronic paranoid schizophrenia         Date:    Visit #: 11     6 7 8 9 10   Manuals             PROM/ stretching             Hip mobs (distraction/ ant/ post)       RR grade 2-3      STM/ IASTM             Scar massage           " "  Desensitization             Manual resistance             Quad stretch              Neuro Re-Ed             EDU/ HEP RR     MW RR RR RR    DB         x10    DB+TAC         10x5\" holds ea     Quad set      15x5\" hold ea       SAQ      15x5\" hold ea       LAQ             SLR      X5 R X10    x10   Heel slides             Hip ABD slides             Seated marches      2x5 ea b/l       STS      X10  nv nv 30 STS x9 from standard height chair no UE support X10 no HHA   Step ups 8\" step 2x5 ea 50% UE support > 6\" step to 12\" step through  X10 ea 2 HHA > lateral step up 3\" step x10 ea 2 HHA     4\"  X10 ea 2 HHA nv 6\" step to 12\" step through  X10 ea 2 HHA   6\" step to 12\" step through  X10 ea 2 HHA   Step downs       nv 3\" step x10 ea 2 HHA  3\" x10 ea 2HHA   4 way hip             Open books        10x10\" holds ea b/l 10x10\" holds ea b/l 10x10\" ea   Knee fall outs        10x10\" holds ea b/l 10x10\" holds ea b/l 10x10\" ea                             Ther Ex             Nustep             bike 7' L2 > 3' L3      nv 10' L1 10' L1 10' L2   X-ride      10' lvl 1 4' L2 >  6' L3      Laps around the clinic             6 MWT         883 feet with SPC    TUG         14 seconds SPC    Hip ABD             Hip ADD             Standing marches       X15 ea 2 HHA at railing       Standing foot slides              3 way hip Blue foam x10 ea 1 HHA      nv      bridges       X10 with quick lift and 3\" lowering 2x10 with quick lift and 3\" lowering  2x10 with quick lift and 3\" lowering   Leg press Plate 6    DL 55# x20     SL 35# x20       Plate 7     DL 35# x20    SL 15# x20 Plate 6    DL 45# x20     SL 25# x20   Plate 6    DL 45# x20     SL 25# x20    Hip hikes 10x5\" holds ea        10x5\" holds ea   10x5\" ea                             Ther Activity                                       Gait Training             SPC       3 laps step through gait mechanics close supervision                   Modalities                                     "

## 2024-12-12 ENCOUNTER — OFFICE VISIT (OUTPATIENT)
Age: 54
End: 2024-12-12
Payer: MEDICARE

## 2024-12-12 DIAGNOSIS — M25.551 RIGHT HIP PAIN: Primary | ICD-10-CM

## 2024-12-12 DIAGNOSIS — Z74.09 IMPAIRED FUNCTIONAL MOBILITY, BALANCE, GAIT, AND ENDURANCE: ICD-10-CM

## 2024-12-12 DIAGNOSIS — R29.898 WEAKNESS OF RIGHT HIP: ICD-10-CM

## 2024-12-12 DIAGNOSIS — Z09 S/P ORTHOPEDIC SURGERY, FOLLOW-UP EXAM: ICD-10-CM

## 2024-12-12 PROCEDURE — 97112 NEUROMUSCULAR REEDUCATION: CPT | Performed by: PHYSICAL THERAPIST

## 2024-12-12 PROCEDURE — 97110 THERAPEUTIC EXERCISES: CPT | Performed by: PHYSICAL THERAPIST

## 2024-12-12 NOTE — PROGRESS NOTES
"Daily Note     Today's date: 2024  Patient name: Em Koo  : 1970  MRN: [id number]  Referring provider: Zuly Nichole, *  Dx:   Encounter Diagnosis     ICD-10-CM    1. Right hip pain  M25.551       2. S/P orthopedic surgery, follow-up exam  Z09       3. Impaired functional mobility, balance, gait, and endurance  Z74.09       4. Weakness of right hip  R29.898           Start Time: 1600  Stop Time: 1645  Total time in clinic (min): 45 minutes    Subjective: Patient noted a 3/10 pain at the start of the session. Patient noted muscle soreness after last session.       Objective: See treatment diary below      Assessment: Patient performed Recumbent bike aerobic exercise to increase blood flow to the area being treated, prepare the muscles for strength training and stretching, improve overall tolerance to activity, and aerobic endurance. PT introduced SLS on a firm surface with 1-2 HHA by patient. Patient had no LOB, however she noted difficulty on the R LE when compared to the L. Patient performed 8\" step ups with moderate cues for breathing during the right LE leading. Patient did improve with strength 2* performing lateral step ups on the 6\" step when compared to the 3\" step with fatigue noted. Patient was educated on her HEP. Patient would benefit from continued PT to allow the patient to return to her PLOF.         Plan: Continue per plan of care.      Precautions: standard precautions, anxiety, chronic paranoid schizophrenia         Date:    Visit #: 11 12    6 7 8 9 10   Manuals             PROM/ stretching             Hip mobs (distraction/ ant/ post)       RR grade 2-3      STM/ IASTM             Scar massage             Desensitization             Manual resistance             Quad stretch              Neuro Re-Ed             EDU/ HEP RR MW    MW RR RR RR    DB         x10    DB+TAC         10x5\" holds ea     Quad set      15x5\" hold ea     " "  SAQ      15x5\" hold ea       LAQ             SLR      X5 R X10    x10   Heel slides             Hip ABD slides             Seated marches      2x5 ea b/l       STS      X10  nv nv 30 STS x9 from standard height chair no UE support X10 no HHA   Step ups 8\" step 2x5 ea 50% UE support > 6\" step to 12\" step through  X10 ea 2 HHA > lateral step up 3\" step x10 ea 2 HHA 8\" step 2x5 ea 50% UE support ; lateral step up 6\" step x10 ea 2 HHA    4\"  X10 ea 2 HHA nv 6\" step to 12\" step through  X10 ea 2 HHA   6\" step to 12\" step through  X10 ea 2 HHA   Lateral stepping  Yellow COB  1 lap           Diagonal stepping  Yellow COB  1 lap           Step downs       nv 3\" step x10 ea 2 HHA  3\" x10 ea 2HHA   4 way hip             Open books        10x10\" holds ea b/l 10x10\" holds ea b/l 10x10\" ea   Knee fall outs        10x10\" holds ea b/l 10x10\" holds ea b/l 10x10\" ea   SLS  2x30\" 1-2 HHA                        Ther Ex             Nustep             bike 7' L2 > 3' L3 2' L2  8' L3     nv 10' L1 10' L1 10' L2   X-ride      10' lvl 1 4' L2 >  6' L3      Laps around the clinic             6 MWT         883 feet with SPC    TUG         14 seconds SPC    Hip ABD             Hip ADD             Standing marches       X15 ea 2 HHA at railing       Standing foot slides              3 way hip Blue foam x10 ea 1 HHA Blue foam x10 ea 1 HHA     nv      bridges       X10 with quick lift and 3\" lowering 2x10 with quick lift and 3\" lowering  2x10 with quick lift and 3\" lowering   Leg press Plate 6    DL 55# x20     SL 35# x20  Plate 6    DL 55# x20     SL 35# x20      Plate 7     DL 35# x20    SL 15# x20 Plate 6    DL 45# x20     SL 25# x20   Plate 6    DL 45# x20     SL 25# x20    Hip hikes 10x5\" holds ea  10x5\" holds ea      10x5\" holds ea   10x5\" ea                             Ther Activity                                       Gait Training             SPC       3 laps step through gait mechanics close supervision                   Modalities  "

## 2024-12-16 ENCOUNTER — OFFICE VISIT (OUTPATIENT)
Age: 54
End: 2024-12-16
Payer: MEDICARE

## 2024-12-16 DIAGNOSIS — Z09 S/P ORTHOPEDIC SURGERY, FOLLOW-UP EXAM: ICD-10-CM

## 2024-12-16 DIAGNOSIS — R29.898 WEAKNESS OF RIGHT HIP: ICD-10-CM

## 2024-12-16 DIAGNOSIS — Z74.09 IMPAIRED FUNCTIONAL MOBILITY, BALANCE, GAIT, AND ENDURANCE: ICD-10-CM

## 2024-12-16 DIAGNOSIS — M25.551 RIGHT HIP PAIN: Primary | ICD-10-CM

## 2024-12-16 PROCEDURE — 97112 NEUROMUSCULAR REEDUCATION: CPT

## 2024-12-16 PROCEDURE — 97110 THERAPEUTIC EXERCISES: CPT

## 2024-12-16 NOTE — PROGRESS NOTES
Daily Note     Today's date: 2024  Patient name: Em Koo  : 1970  MRN: [id number]  Referring provider: Zuly Nichole, *  Dx:   Encounter Diagnosis     ICD-10-CM    1. Right hip pain  M25.551       2. S/P orthopedic surgery, follow-up exam  Z09       3. Impaired functional mobility, balance, gait, and endurance  Z74.09       4. Weakness of right hip  R29.898           Start Time: 945  Stop Time: 1030  Total time in clinic (min): 45 minutes    Subjective: Pt reports she has R groin/ tightness today. Pt reports she is returning to work tomorrow, with the restriction of no lifting/ carrying weight.      Objective: See treatment diary below      Assessment: Pt performed bike aerobic exercises to increase blood flow to the area being treated, prepare the muscles for strength training and stretching, improve overall tolerance to activity, and aerobic endurance. Next session will use the treadmill for a warm up to improve muscular endurance and address gait mechanics. Pt was able to complete 3 laps around the clinic carrying the SPC. When ambulating without AD pt had mild gait impairments, noticeable muscle shaking in the R LE as the stance limb and mild lateral weight shifting. Pt had greatest difficulty with walking cone taps. Introduced supine quad stretch with stretch strap to decrease symptoms noted in the R hip flexor. Pt continues to benefit from physical therapy in order to continue progressing towards goals as outlined and return to PLOF. Will continue to modify and advance current POC based on overall progress and symptom irritability.       Plan: Continue per plan of care.  Progress treatment as tolerated.       Precautions: standard precautions, anxiety, chronic paranoid schizophrenia         Date:    Visit #: 11 12 13   6 7 8 9 10   Manuals             PROM/ stretching             Hip mobs (distraction/ ant/ post)       RR grade 2-3     "  STM/ IASTM             Scar massage             Desensitization             Manual resistance             Quad stretch              Neuro Re-Ed             EDU/ HEP RR MW RR   MW RR RR RR    DB         x10    DB+TAC         10x5\" holds ea     Quad set      15x5\" hold ea       SAQ      15x5\" hold ea       LAQ             SLR      X5 R X10    x10   Heel slides             Hip ABD slides             Seated marches      2x5 ea b/l       STS      X10  nv nv 30 STS x9 from standard height chair no UE support X10 no HHA   Step ups 8\" step 2x5 ea 50% UE support > 6\" step to 12\" step through  X10 ea 2 HHA > lateral step up 3\" step x10 ea 2 HHA 8\" step 2x5 ea 50% UE support ; lateral step up 6\" step x10 ea 2 HHA 8\" step x10 ea 50% UE support; lateral step up 6\" step x12 ea 2 HHA   4\"  X10 ea 2 HHA nv 6\" step to 12\" step through  X10 ea 2 HHA   6\" step to 12\" step through  X10 ea 2 HHA   Stair negotiation   X10 entire stair case finger tips on railings          Lateral stepping  Yellow COB  1 lap           Diagonal stepping  Yellow COB  1 lap           Step downs       nv 3\" step x10 ea 2 HHA  3\" x10 ea 2HHA   4 way hip             Open books        10x10\" holds ea b/l 10x10\" holds ea b/l 10x10\" ea   Knee fall outs        10x10\" holds ea b/l 10x10\" holds ea b/l 10x10\" ea   SLS  2x30\" 1-2 HHA nv          Cone taps    2 laps ea no UE support taps and walk overs          Hip flexor stretch    With strap 3x30\" holds ea                                     Ther Ex             Nustep             bike 7' L2 > 3' L3 2' L2  8' L3 5' L3 >  2.5' L2 >  2.5' L3    nv 10' L1 10' L1 10' L2   X-ride      10' lvl 1 4' L2 >  6' L3      Laps around the clinic             6 MWT         883 feet with SPC    TUG         14 seconds SPC    Hip ABD             Hip ADD             Standing marches       X15 ea 2 HHA at railing       Standing foot slides              3 way hip Blue foam x10 ea 1 HHA Blue foam x10 ea 1 HHA     nv      bridges       X10 " "with quick lift and 3\" lowering 2x10 with quick lift and 3\" lowering  2x10 with quick lift and 3\" lowering   Leg press Plate 6    DL 55# x20     SL 35# x20  Plate 6    DL 55# x20     SL 35# x20  Plate 6    DL 75# x20     SL 45# x20  Plate 3   Plate 7     DL 35# x20    SL 15# x20 Plate 6    DL 45# x20     SL 25# x20   Plate 6    DL 45# x20     SL 25# x20    Hip hikes 10x5\" holds ea  10x5\" holds ea      10x5\" holds ea   10x5\" ea                             Ther Activity                                       Gait Training             SPC       3 laps step through gait mechanics close supervision                   Modalities                                                  "

## 2024-12-19 ENCOUNTER — OFFICE VISIT (OUTPATIENT)
Age: 54
End: 2024-12-19
Payer: MEDICARE

## 2024-12-19 DIAGNOSIS — M25.551 RIGHT HIP PAIN: Primary | ICD-10-CM

## 2024-12-19 DIAGNOSIS — Z74.09 IMPAIRED FUNCTIONAL MOBILITY, BALANCE, GAIT, AND ENDURANCE: ICD-10-CM

## 2024-12-19 DIAGNOSIS — R29.898 WEAKNESS OF RIGHT HIP: ICD-10-CM

## 2024-12-19 DIAGNOSIS — Z09 S/P ORTHOPEDIC SURGERY, FOLLOW-UP EXAM: ICD-10-CM

## 2024-12-19 PROCEDURE — 97112 NEUROMUSCULAR REEDUCATION: CPT

## 2024-12-19 PROCEDURE — 97110 THERAPEUTIC EXERCISES: CPT

## 2024-12-19 NOTE — PROGRESS NOTES
Daily Note     Today's date: 2024  Patient name: Em Koo  : 1970  MRN: [id number]  Referring provider: Zuly Nichole, *  Dx:   Encounter Diagnosis     ICD-10-CM    1. Right hip pain  M25.551       2. S/P orthopedic surgery, follow-up exam  Z09       3. Impaired functional mobility, balance, gait, and endurance  Z74.09       4. Weakness of right hip  R29.898           Start Time: 0900  Stop Time: 945  Total time in clinic (min): 45 minutes    Subjective: Pt reports 2/10 pain in the hip today. Pt reports with returning to work she is more fatigued but denies any issues.      Objective: See treatment diary below      Assessment: Pt performed treadmill aerobic exercises to increase blood flow to the area being treated, prepare the muscles for strength training and stretching, improve overall tolerance to activity, and aerobic endurance. Pt was able to complete SLB without any UE support today, did have mild sway laterally. Pt had moderate difficulty with SLB at [facility] requiring close supervision from therapist for safety and intermittent toe tap assist to recover her balance. Pt did well with session as outlined but and did fatigue appropriately by the end of the session. Pt continues to benefit from physical therapy in order to continue progressing towards goals as outlined and return to PLOF. Will continue to modify and advance current POC based on overall progress and symptom irritability.       Plan: Continue per plan of care.  Progress treatment as tolerated.       Precautions: standard precautions, anxiety, chronic paranoid schizophrenia         Date:    Visit #: 11 12 13 14  6 7 8 9 10   Manuals             PROM/ stretching             Hip mobs (distraction/ ant/ post)       RR grade 2-3      STM/ IASTM             Scar massage             Desensitization             Manual resistance             Quad stretch              Neuro  "Re-Ed             EDU/ HEP RR MW RR RR  MW RR RR RR    DB         x10    DB+TAC         10x5\" holds ea     Quad set      15x5\" hold ea       SAQ      15x5\" hold ea       LAQ             SLR      X5 R X10    x10   Heel slides             Hip ABD slides             Seated marches      2x5 ea b/l       STS      X10  nv nv 30 STS x9 from standard height chair no UE support X10 no HHA   Step ups 8\" step 2x5 ea 50% UE support > 6\" step to 12\" step through  X10 ea 2 HHA > lateral step up 3\" step x10 ea 2 HHA 8\" step 2x5 ea 50% UE support ; lateral step up 6\" step x10 ea 2 HHA 8\" step x10 ea 50% UE support; lateral step up 6\" step x12 ea 2 HHA   4\"  X10 ea 2 HHA nv 6\" step to 12\" step through  X10 ea 2 HHA   6\" step to 12\" step through  X10 ea 2 HHA   Stair negotiation   X10 entire stair case finger tips on railings          Lateral stepping  Yellow COB  1 lap  Yellow COB 2 laps         Diagonal stepping  Yellow COB  1 lap  Yellow COB 2 laps         Step downs       nv 3\" step x10 ea 2 HHA  3\" x10 ea 2HHA   4 way hip             Open books        10x10\" holds ea b/l 10x10\" holds ea b/l 10x10\" ea   Knee fall outs        10x10\" holds ea b/l 10x10\" holds ea b/l 10x10\" ea   SLS  2x30\" 1-2 HHA nv 2x30\" holds no UE support         Cone taps    2 laps ea no UE support taps and walk overs          Hip flexor stretch    With strap 3x30\" holds ea  With strap 3x30\" holds ea          SLB at rebounder    x10 ea 2# ball         SLB with resisted hip flexor strengthening    nv                                   Ther Ex             Nustep             bike 7' L2 > 3' L3 2' L2  8' L3 5' L3 >  2.5' L2 >  2.5' L3    nv 10' L1 10' L1 10' L2   X-ride      10' lvl 1 4' L2 >  6' L3      treadmill    10' 1% incline 1.3- 1.7 mph 1 UE support         Laps around the clinic    2 laps no AD         6 MWT         883 feet with SPC    TUG         14 seconds SPC    Hip ABD             Hip ADD             Standing artem       X15 ea 2 HHA at railing     " "  Standing foot slides              3 way hip Blue foam x10 ea 1 HHA Blue foam x10 ea 1 HHA  Blue foam x12 ea 1 HHA   nv      bridges       X10 with quick lift and 3\" lowering 2x10 with quick lift and 3\" lowering  2x10 with quick lift and 3\" lowering   Leg press Plate 6    DL 55# x20     SL 35# x20  Plate 6    DL 55# x20     SL 35# x20  Plate 6    DL 75# x20     SL 45# x20  Plate 3    DL 75# x20    SL 45# x20   Plate 7     DL 35# x20    SL 15# x20 Plate 6    DL 45# x20     SL 25# x20   Plate 6    DL 45# x20     SL 25# x20    Hip hikes 10x5\" holds ea  10x5\" holds ea      10x5\" holds ea   10x5\" ea                             Ther Activity                                       Gait Training             SPC       3 laps step through gait mechanics close supervision                   Modalities                                                    "

## 2024-12-23 ENCOUNTER — OFFICE VISIT (OUTPATIENT)
Age: 54
End: 2024-12-23
Payer: MEDICARE

## 2024-12-23 DIAGNOSIS — Z09 S/P ORTHOPEDIC SURGERY, FOLLOW-UP EXAM: ICD-10-CM

## 2024-12-23 DIAGNOSIS — R29.898 WEAKNESS OF RIGHT HIP: ICD-10-CM

## 2024-12-23 DIAGNOSIS — Z74.09 IMPAIRED FUNCTIONAL MOBILITY, BALANCE, GAIT, AND ENDURANCE: ICD-10-CM

## 2024-12-23 DIAGNOSIS — M25.551 RIGHT HIP PAIN: Primary | ICD-10-CM

## 2024-12-23 PROCEDURE — 97112 NEUROMUSCULAR REEDUCATION: CPT

## 2024-12-23 PROCEDURE — 97110 THERAPEUTIC EXERCISES: CPT

## 2024-12-23 NOTE — PROGRESS NOTES
Daily Note     Today's date: 2024  Patient name: Em Koo  : 1970  MRN: [id number]  Referring provider: Zuly Nichole, *  Dx:   Encounter Diagnosis     ICD-10-CM    1. Right hip pain  M25.551       2. S/P orthopedic surgery, follow-up exam  Z09       3. Impaired functional mobility, balance, gait, and endurance  Z74.09       4. Weakness of right hip  R29.898           Start Time: 1530  Stop Time: 1615  Total time in clinic (min): 45 minutes    Subjective: Pt reports 1-2/10 pain in the R groin. Pt reports it is more difficult to watch without her AD at home on the carpet compared to during her PT sessions.      Objective: See treatment diary below      Assessment: Pt performed treadmill aerobic exercises to increase blood flow to the area being treated, prepare the muscles for strength training and stretching, improve overall tolerance to activity, and aerobic endurance. Pt did well with introduction to controlled jumping in an anti-gravity position on th leg press, able to increase weight. Pt continues to require about 50% UE support when performing step ups with the R LE, however motion was more controlled. Did use verbal cueing to correct excessive anterior weight shift onto the lead leg when performing step ups. Pt was appropriately challenged with stool scoots. Pt continues to benefit from physical therapy in order to continue progressing towards goals as outlined and return to PLOF. Will continue to modify and advance current POC based on overall progress and symptom irritability.       Plan: Continue per plan of care.  Progress treatment as tolerated.       Precautions: standard precautions, anxiety, chronic paranoid schizophrenia         Date:    Visit #: 11 12 13 14 15    9 10   Manuals             PROM/ stretching             Hip mobs (distraction/ ant/ post)             STM/ IASTM             Scar massage             Desensitization        "      Manual resistance             Quad stretch              Neuro Re-Ed             EDU/ HEP RR MW RR RR RR    RR    STS         30 STS x9 from standard height chair no UE support X10 no HHA   Step ups 8\" step 2x5 ea 50% UE support > 6\" step to 12\" step through  X10 ea 2 HHA > lateral step up 3\" step x10 ea 2 HHA 8\" step 2x5 ea 50% UE support ; lateral step up 6\" step x10 ea 2 HHA 8\" step x10 ea 50% UE support; lateral step up 6\" step x12 ea 2 HHA  8\" step x10 ea 50% UE support; lateral step up 6\" step x12 ea 2 HHA      6\" step to 12\" step through  X10 ea 2 HHA   Stair negotiation   X10 entire stair case finger tips on railings          Lateral stepping  Yellow COB  1 lap  Yellow COB 2 laps 2 laps Yellow COB        Diagonal stepping  Yellow COB  1 lap  Yellow COB 2 laps 2 laps Yellow COB        Step downs          3\" x10 ea 2HHA   Open books         10x10\" holds ea b/l 10x10\" ea   Knee fall outs         10x10\" holds ea b/l 10x10\" ea   SLS  2x30\" 1-2 HHA nv 2x30\" holds no UE support         Cone taps    2 laps ea no UE support taps and walk overs          Hip flexor stretch    With strap 3x30\" holds ea  With strap 3x30\" holds ea          SLB at rebounder    x10 ea 2# ball nv        SLB with resisted hip flexor strengthening    nv nv                                  Ther Ex             Nustep             bike 7' L2 > 3' L3 2' L2  8' L3 5' L3 >  2.5' L2 >  2.5' L3      10' L1 10' L2   X-ride             treadmill    10' 1% incline 1.3- 1.7 mph 1 UE support 10' 1.5-2% incline at 1.5-2.0 mph         Laps around the clinic    2 laps no AD         6 MWT         883 feet with SPC    TUG         14 seconds SPC    3 way hip Blue foam x10 ea 1 HHA Blue foam x10 ea 1 HHA  Blue foam x12 ea 1 HHA Blue foam x15 ea 1 HHA        Stool scoots     1 lap        bridges          2x10 with quick lift and 3\" lowering   Leg press Plate 6    DL 55# x20     SL 35# x20  Plate 6    DL 55# x20     SL 35# x20  Plate 6    DL 75# x20     SL 45# " "x20  Plate 3    DL 75# x20    SL 45# x20 Plate 2    DL 85# x20     SL 55# x20     Jumping (chair flat) 15# x20 > 25# x20     Plate 6    DL 45# x20     SL 25# x20    Hip hikes 10x5\" holds ea  10x5\" holds ea        10x5\" ea                             Ther Activity                                       Gait Training             SPC                          Modalities                                                      "

## 2024-12-26 ENCOUNTER — OFFICE VISIT (OUTPATIENT)
Age: 54
End: 2024-12-26
Payer: MEDICARE

## 2024-12-26 DIAGNOSIS — R29.898 WEAKNESS OF RIGHT HIP: ICD-10-CM

## 2024-12-26 DIAGNOSIS — M25.551 RIGHT HIP PAIN: Primary | ICD-10-CM

## 2024-12-26 DIAGNOSIS — Z74.09 IMPAIRED FUNCTIONAL MOBILITY, BALANCE, GAIT, AND ENDURANCE: ICD-10-CM

## 2024-12-26 DIAGNOSIS — Z09 S/P ORTHOPEDIC SURGERY, FOLLOW-UP EXAM: ICD-10-CM

## 2024-12-26 PROCEDURE — 97112 NEUROMUSCULAR REEDUCATION: CPT

## 2024-12-26 PROCEDURE — 97110 THERAPEUTIC EXERCISES: CPT

## 2024-12-26 NOTE — PROGRESS NOTES
Daily Note     Today's date: 2024  Patient name: Em Koo  : 1970  MRN: [id number]  Referring provider: Zuly Nichole, *  Dx:   Encounter Diagnosis     ICD-10-CM    1. Right hip pain  M25.551       2. S/P orthopedic surgery, follow-up exam  Z09       3. Impaired functional mobility, balance, gait, and endurance  Z74.09       4. Weakness of right hip  R29.898           Start Time: 1445  Stop Time: 1530  Total time in clinic (min): 45 minutes    Subjective: Pt reports that she worked for about 2.5 hours before she required a break, noting her shift was extra busy as a coworker was out sick. Pt reports she was more fatigued today. Pt had 1/10 pain.      Objective: See treatment diary below      Assessment: Pt performed bike aerobic exercises to increase blood flow to the area being treated, prepare the muscles for strength training and stretching, improve overall tolerance to activity, and aerobic endurance. Pt did well with addition of a more unstable surface for 3 way hip and more challenging balance activities. When balancing on the square fitter board, pt was able to remain balanced for about 5 seconds at a time before shifting laterally. Provided pt with increased resistance band and updated HEP. Pt left session fatigued but without increase in symptoms. Pt continues to benefit from physical therapy in order to continue progressing towards goals as outlined and return to PLOF. Will continue to modify and advance current POC based on overall progress and symptom irritability.       Plan: Continue per plan of care.  Progress treatment as tolerated.       Precautions: standard precautions, anxiety, chronic paranoid schizophrenia         Date:    Visit #: 11 12 13 14 15 16   9 10   Manuals             PROM/ stretching             Hip mobs (distraction/ ant/ post)             STM/ IASTM             Scar massage             Desensitization          "    Manual resistance             Quad stretch              Neuro Re-Ed             EDU/ HEP RR MW RR RR RR RR   RR    STS         30 STS x9 from standard height chair no UE support X10 no HHA   Step ups 8\" step 2x5 ea 50% UE support > 6\" step to 12\" step through  X10 ea 2 HHA > lateral step up 3\" step x10 ea 2 HHA 8\" step 2x5 ea 50% UE support ; lateral step up 6\" step x10 ea 2 HHA 8\" step x10 ea 50% UE support; lateral step up 6\" step x12 ea 2 HHA  8\" step x10 ea 50% UE support; lateral step up 6\" step x12 ea 2 HHA      6\" step to 12\" step through  X10 ea 2 HHA   Stair negotiation   X10 entire stair case finger tips on railings          Lateral stepping  Yellow COB  1 lap  Yellow COB 2 laps 2 laps Yellow COB 2 laps yellow COB       Diagonal stepping  Yellow COB  1 lap  Yellow COB 2 laps 2 laps Yellow COB 2 laps yellow COB       Step downs          3\" x10 ea 2HHA   Open books         10x10\" holds ea b/l 10x10\" ea   Knee fall outs         10x10\" holds ea b/l 10x10\" ea   SLS  2x30\" 1-2 HHA nv 2x30\" holds no UE support         Cone taps    2 laps ea no UE support taps and walk overs          Hip flexor stretch    With strap 3x30\" holds ea  With strap 3x30\" holds ea          SLB at rebounder    x10 ea 2# ball nv X10 ea 2 # ball       SLB with resisted hip flexor strengthening    nv nv 10x5\" holds ea BTB 1 HHA       Balance on fitter board      Square 3x30\"                     Ther Ex             Nustep             bike 7' L2 > 3' L3 2' L2  8' L3 5' L3 >  2.5' L2 >  2.5' L3   10' L2   10' L1 10' L2   X-ride             treadmill    10' 1% incline 1.3- 1.7 mph 1 UE support 10' 1.5-2% incline at 1.5-2.0 mph         Laps around the clinic    2 laps no AD         6 MWT         883 feet with SPC    TUG         14 seconds SPC    3 way hip Blue foam x10 ea 1 HHA Blue foam x10 ea 1 HHA  Blue foam x12 ea 1 HHA Blue foam x15 ea 1 HHA Black bebeto disc x10 ea 1 HHA       Stool scoots     1 lap 1 lap       bridges          2x10 with " "quick lift and 3\" lowering   Leg press Plate 6    DL 55# x20     SL 35# x20  Plate 6    DL 55# x20     SL 35# x20  Plate 6    DL 75# x20     SL 45# x20  Plate 3    DL 75# x20    SL 45# x20 Plate 2    DL 85# x20     SL 55# x20     Jumping (chair flat) 15# x20 > 25# x20 nv    Plate 6    DL 45# x20     SL 25# x20    Hip hikes 10x5\" holds ea  10x5\" holds ea        10x5\" ea                             Ther Activity                                       Gait Training             SPC                          Modalities                                                        "

## 2024-12-30 ENCOUNTER — OFFICE VISIT (OUTPATIENT)
Age: 54
End: 2024-12-30
Payer: MEDICARE

## 2024-12-30 DIAGNOSIS — Z09 S/P ORTHOPEDIC SURGERY, FOLLOW-UP EXAM: ICD-10-CM

## 2024-12-30 DIAGNOSIS — R29.898 WEAKNESS OF RIGHT HIP: ICD-10-CM

## 2024-12-30 DIAGNOSIS — M25.551 RIGHT HIP PAIN: Primary | ICD-10-CM

## 2024-12-30 DIAGNOSIS — Z74.09 IMPAIRED FUNCTIONAL MOBILITY, BALANCE, GAIT, AND ENDURANCE: ICD-10-CM

## 2024-12-30 PROCEDURE — 97110 THERAPEUTIC EXERCISES: CPT

## 2024-12-30 PROCEDURE — 97112 NEUROMUSCULAR REEDUCATION: CPT

## 2024-12-30 NOTE — PROGRESS NOTES
Daily Note     Today's date: 2024  Patient name: Em Koo  : 1970  MRN: [id number]  Referring provider: Zuly Nichole, *  Dx:   Encounter Diagnosis     ICD-10-CM    1. Right hip pain  M25.551       2. S/P orthopedic surgery, follow-up exam  Z09       3. Impaired functional mobility, balance, gait, and endurance  Z74.09       4. Weakness of right hip  R29.898           Start Time: 1615  Stop Time: 1700  Total time in clinic (min): 45 minutes    Subjective: Pt denies any pain and reports she felt good following last session. Pt continues to report compliance with her HEP and denies any questions or concerns at this time.      Objective: See treatment diary below      Assessment: Pt performed treadmill aerobic exercises to increase blood flow to the area being treated, prepare the muscles for strength training and stretching, improve overall tolerance to activity, and aerobic endurance. Simulated pre-running activities via loading/ jumping and SLB on variable surfaces. Pt fatigued quickly with high repetition activities. Greatest difficulty noted with trialing high knees on the trampoline, was able to perform with just quick paced movements. Pt left session fatigued but without any increase in pain. Pt continues to benefit from physical therapy in order to continue progressing towards goals as outlined and return to PLOF. Will continue to modify and advance current POC based on overall progress and symptom irritability.       Plan: Continue per plan of care.  Progress treatment as tolerated.       Precautions: standard precautions, anxiety, chronic paranoid schizophrenia         Date:    Visit #: 11 12 13 14 15 16 17  9 10   Manuals             PROM/ stretching             Hip mobs (distraction/ ant/ post)             STM/ IASTM             Scar massage             Desensitization             Manual resistance             Quad stretch         "      Neuro Re-Ed             EDU/ HEP RR MW RR RR RR RR RR  RR    STS         30 STS x9 from standard height chair no UE support X10 no HHA   Step ups 8\" step 2x5 ea 50% UE support > 6\" step to 12\" step through  X10 ea 2 HHA > lateral step up 3\" step x10 ea 2 HHA 8\" step 2x5 ea 50% UE support ; lateral step up 6\" step x10 ea 2 HHA 8\" step x10 ea 50% UE support; lateral step up 6\" step x12 ea 2 HHA  8\" step x10 ea 50% UE support; lateral step up 6\" step x12 ea 2 HHA      6\" step to 12\" step through  X10 ea 2 HHA   Stair negotiation   X10 entire stair case finger tips on railings          Lateral stepping  Yellow COB  1 lap  Yellow COB 2 laps 2 laps Yellow COB 2 laps yellow COB       Diagonal stepping  Yellow COB  1 lap  Yellow COB 2 laps 2 laps Yellow COB 2 laps yellow COB       Step downs          3\" x10 ea 2HHA   Open books         10x10\" holds ea b/l 10x10\" ea   Knee fall outs         10x10\" holds ea b/l 10x10\" ea   SLS  2x30\" 1-2 HHA nv 2x30\" holds no UE support         Cone taps    2 laps ea no UE support taps and walk overs          Hip flexor stretch    With strap 3x30\" holds ea  With strap 3x30\" holds ea          SLB at rebounder    x10 ea 2# ball nv X10 ea 2 # ball       SLB with resisted hip flexor strengthening    nv nv 10x5\" holds ea BTB 1 HHA       Balance on fitter board      Square 3x30\"        1/2 bosu step ups with contralateral high knee       2X10 ea 1 HHA       trampoline       X20 ea UE support marches with 3\" holds > x20 ea UE support quick marches                                              Ther Ex             Nustep             bike 7' L2 > 3' L3 2' L2  8' L3 5' L3 >  2.5' L2 >  2.5' L3   10' L2   10' L1 10' L2   X-ride             treadmill    10' 1% incline 1.3- 1.7 mph 1 UE support 10' 1.5-2% incline at 1.5-2.0 mph   10' 2.5-3% incline @ 2.3 mph      Laps around the clinic    2 laps no AD   2 laps no AD       6 MWT         883 feet with SPC    TUG         14 seconds SPC    3 way hip Blue " "foam x10 ea 1 HHA Blue foam x10 ea 1 HHA  Blue foam x12 ea 1 HHA Blue foam x15 ea 1 HHA Black bebeto disc x10 ea 1 HHA Black bebeto disc x10 ea 1 HHA      Stool scoots     1 lap 1 lap 2 laps       bridges          2x10 with quick lift and 3\" lowering   Leg press Plate 6    DL 55# x20     SL 35# x20  Plate 6    DL 55# x20     SL 35# x20  Plate 6    DL 75# x20     SL 45# x20  Plate 3    DL 75# x20    SL 45# x20 Plate 2    DL 85# x20     SL 55# x20     Jumping (chair flat) 15# x20 > 25# x20 nv Plate 2    DL 85# x20     SL 55# x20     Jumping (chair flat)     DL 35# x20     SL 15# x20 ea DL 95#  Plate 6    DL 45# x20     SL 25# x20    Hip hikes 10x5\" holds ea  10x5\" holds ea        10x5\" ea                             Ther Activity                                       Gait Training             SPC                          Modalities                                                          "

## 2025-01-02 ENCOUNTER — OFFICE VISIT (OUTPATIENT)
Age: 55
End: 2025-01-02
Payer: MEDICARE

## 2025-01-02 DIAGNOSIS — M25.551 RIGHT HIP PAIN: Primary | ICD-10-CM

## 2025-01-02 DIAGNOSIS — R29.898 WEAKNESS OF RIGHT HIP: ICD-10-CM

## 2025-01-02 DIAGNOSIS — Z74.09 IMPAIRED FUNCTIONAL MOBILITY, BALANCE, GAIT, AND ENDURANCE: ICD-10-CM

## 2025-01-02 DIAGNOSIS — Z09 S/P ORTHOPEDIC SURGERY, FOLLOW-UP EXAM: ICD-10-CM

## 2025-01-02 PROCEDURE — 97112 NEUROMUSCULAR REEDUCATION: CPT

## 2025-01-02 PROCEDURE — 97110 THERAPEUTIC EXERCISES: CPT

## 2025-01-02 NOTE — PROGRESS NOTES
Daily Note     Today's date: 2025  Patient name: Em Koo  : 1970  MRN: [id number]  Referring provider: Zuly Nichole, *  Dx:   Encounter Diagnosis     ICD-10-CM    1. Right hip pain  M25.551       2. S/P orthopedic surgery, follow-up exam  Z09       3. Impaired functional mobility, balance, gait, and endurance  Z74.09       4. Weakness of right hip  R29.898           Start Time: 1400  Stop Time: 1445  Total time in clinic (min): 45 minutes    Subjective: Pt reports at work today she had to do some squatting and notes mild muscle soreness of the R hip. Pt reports following last session she was fatigued but without any increase in pain.      Objective: See treatment diary below      Assessment: Pt performed treadmill aerobic exercises to increase blood flow to the area being treated, prepare the muscles for strength training and stretching, improve overall tolerance to activity, and aerobic endurance. Pt ambulated on the treadmill with excellent gait mechanics. Pt was able to increase resistance bands, noted greatest difficulty with diagonal stepping to the L with the R LE. Pt was able to perform high knees and jump on the trampoline today without any increase in pain, but did note fatigue. Next session will continue with jumping activities and will introduce some squatting. Pt continues to benefit from physical therapy in order to continue progressing towards goals as outlined and return to PLOF. Will continue to modify and advance current POC based on overall progress and symptom irritability.       Plan: Continue per plan of care.  Progress treatment as tolerated.       Precautions: standard precautions, anxiety, chronic paranoid schizophrenia         Date:      Visit #: 11 12 13 14 15 16 17 18     Manuals             PROM/ stretching             Hip mobs (distraction/ ant/ post)             STM/ IASTM             Scar massage            "  Desensitization             Manual resistance             Quad stretch              Neuro Re-Ed             EDU/ HEP RR MW RR RR RR RR RR RR     STS             Step ups 8\" step 2x5 ea 50% UE support > 6\" step to 12\" step through  X10 ea 2 HHA > lateral step up 3\" step x10 ea 2 HHA 8\" step 2x5 ea 50% UE support ; lateral step up 6\" step x10 ea 2 HHA 8\" step x10 ea 50% UE support; lateral step up 6\" step x12 ea 2 HHA  8\" step x10 ea 50% UE support; lateral step up 6\" step x12 ea 2 HHA        Stair negotiation   X10 entire stair case finger tips on railings          Lateral stepping  Yellow COB  1 lap  Yellow COB 2 laps 2 laps Yellow COB 2 laps yellow COB  2 laps red COB     Diagonal stepping  Yellow COB  1 lap  Yellow COB 2 laps 2 laps Yellow COB 2 laps yellow COB  2 laps red COB     Step downs             Open books             Knee fall outs             SLS  2x30\" 1-2 HHA nv 2x30\" holds no UE support         Cone taps    2 laps ea no UE support taps and walk overs          Hip flexor stretch    With strap 3x30\" holds ea  With strap 3x30\" holds ea          SLB at rebounder    x10 ea 2# ball nv X10 ea 2 # ball  nv     SLB with resisted hip flexor strengthening    nv nv 10x5\" holds ea BTB 1 HHA  nv     Balance on fitter board      Square 3x30\"        1/2 bosu step ups with contralateral high knee       2X10 ea 1 HHA  nv     trampoline       X20 ea UE support marches with 3\" holds > x20 ea UE support quick marches  X20 ea UE support marches with 3\" holds > 2x20 high knees > 2x20 jump                                             Ther Ex             Nustep             bike 7' L2 > 3' L3 2' L2  8' L3 5' L3 >  2.5' L2 >  2.5' L3   10' L2       X-ride             treadmill    10' 1% incline 1.3- 1.7 mph 1 UE support 10' 1.5-2% incline at 1.5-2.0 mph   10' 2.5-3% incline @ 2.3 mph 10' 3.5% incline  @ 2.5 mph     Laps around the clinic    2 laps no AD   2 laps no AD       6 MWT             TUG             3 way hip Blue foam " "x10 ea 1 HHA Blue foam x10 ea 1 HHA  Blue foam x12 ea 1 HHA Blue foam x15 ea 1 HHA Black bebeto disc x10 ea 1 HHA Black bebeto disc x10 ea 1 HHA Black bebeto disc x15 ea 1 HHA     Stool scoots     1 lap 1 lap 2 laps  2 laps     bridges             Leg press Plate 6    DL 55# x20     SL 35# x20  Plate 6    DL 55# x20     SL 35# x20  Plate 6    DL 75# x20     SL 45# x20  Plate 3    DL 75# x20    SL 45# x20 Plate 2    DL 85# x20     SL 55# x20     Jumping (chair flat) 15# x20 > 25# x20 nv Plate 2    DL 85# x20     SL 55# x20     Jumping (chair flat)     DL 35# x20     SL 15# x20 ea Plate 2     DL 95# x20     SL 55# x20     Jumping (chair flat)     DL 35# x30     SL 15# x30 ea     Hip hikes 10x5\" holds ea  10x5\" holds ea                                     Ther Activity                                       Gait Training             SPC                          Modalities                                                            "

## 2025-01-06 ENCOUNTER — OFFICE VISIT (OUTPATIENT)
Age: 55
End: 2025-01-06
Payer: MEDICARE

## 2025-01-06 DIAGNOSIS — Z09 S/P ORTHOPEDIC SURGERY, FOLLOW-UP EXAM: ICD-10-CM

## 2025-01-06 DIAGNOSIS — M25.551 RIGHT HIP PAIN: Primary | ICD-10-CM

## 2025-01-06 DIAGNOSIS — Z74.09 IMPAIRED FUNCTIONAL MOBILITY, BALANCE, GAIT, AND ENDURANCE: ICD-10-CM

## 2025-01-06 DIAGNOSIS — R29.898 WEAKNESS OF RIGHT HIP: ICD-10-CM

## 2025-01-06 PROCEDURE — 97112 NEUROMUSCULAR REEDUCATION: CPT

## 2025-01-06 PROCEDURE — 97110 THERAPEUTIC EXERCISES: CPT

## 2025-01-06 NOTE — PROGRESS NOTES
Daily Note     Today's date: 2025  Patient name: Em Koo  : 1970  MRN: [id number]  Referring provider: Zuly Nichole, *  Dx:   Encounter Diagnosis     ICD-10-CM    1. Right hip pain  M25.551       2. S/P orthopedic surgery, follow-up exam  Z09       3. Impaired functional mobility, balance, gait, and endurance  Z74.09       4. Weakness of right hip  R29.898           Start Time: 900  Stop Time: 945  Total time in clinic (min): 45 minutes    Subjective: Pt reports being sore today along her quad, hamstrings, and piriformis. Pt reports following last session she was fatigued but did not have pain.      Objective: See treatment diary below      Assessment: Pt performed bike aerobic exercises to increase blood flow to the area being treated, prepare the muscles for strength training and stretching, improve overall tolerance to activity, and aerobic endurance. Focused session around LE chain strengthening with emphasis on the glutes. With SL bridging pt had greatest difficulty due to weakness with isometric hold of the R LE. Pt continues to benefit from stretching at the end of her sessions. Provided pt with updated HEP. Pt continues to benefit from physical therapy in order to continue progressing towards goals as outlined and return to PLOF. Will continue to modify and advance current POC based on overall progress and symptom irritability.       Plan: Continue per plan of care.  Progress note during next visit.  Progress treatment as tolerated.       Precautions: standard precautions, anxiety, chronic paranoid schizophrenia     Access Code: [email or web address]  URL: [email or web address]  Date: 2025  Prepared by: Cherie Monterroso    Exercises  - Supine Diaphragmatic Breathing  - 2 x daily - 7 x weekly - 1 sets - 10 reps  - Supine Transversus Abdominis Bracing - Hands on Stomach  - 2 x daily - 7 x weekly - 1 sets - 10 reps - 5 sec  hold  - Supine Lower Trunk Rotation  - 2 x daily - 7 x  "weekly - 1 sets - 10 reps - 5 sec hold  - Supine Heel Slide with Strap  - 2 x daily - 7 x weekly - 1 sets - 15 reps - 5-10 sec hold  - Supine Bridge  - 2 x daily - 7 x weekly - 2 sets - 10 reps  - Supine Active Straight Leg Raise  - 2 x daily - 7 x weekly - 1 sets - 10 reps  - Sidelying Thoracic Rotation with Open Book  - 2 x daily - 7 x weekly - 1 sets - 10 reps - 10 sec  hold  - Seated Hip Abduction with Resistance  - 2 x daily - 7 x weekly - 1 sets - 15 reps - 5-10 sec hold  - Seated Hip Adduction Isometrics with Ball  - 2 x daily - 7 x weekly - 1 sets - 15 reps - 5-10 sec hold  - Seated March  - 2 x daily - 7 x weekly - 1 sets - 10 reps  - Seated Long Arc Quad  - 2 x daily - 7 x weekly - 1 sets - 10 reps - 5 sec hold  - Standing Hip Flexion with Counter Support  - 2 x daily - 7 x weekly - 1 sets - 10 reps  - Standing Hip Abduction with Counter Support  - 2 x daily - 7 x weekly - 1 sets - 10 reps  - Standing Hip Extension with Counter Support  - 2 x daily - 7 x weekly - 1 sets - 10 reps  - Standing March with Counter Support  - 2 x daily - 7 x weekly - 1 sets - 10 reps  - Hip Hiking on Step  - 2 x daily - 7 x weekly - 1 sets - 10 reps  - Runner's Climb  - 2 x daily - 7 x weekly - 1 sets - 10 reps  - Quadruped Fire Hydrant  - 2 x daily - 7 x weekly - 1 sets - 10-20 reps  - Supine Bridge  - 2 x daily - 7 x weekly - 1 sets - 10-20 reps  - Single Leg Bridge  - 2 x daily - 7 x weekly - 1 sets - 10-20 reps        Date: 12/9 12/12 12/16 12/19 12/23 12/26 12/30 1/2 1/6    Visit #: 11 12 13 14 15 16 17 18 18    Manuals             PROM/ stretching             Hip mobs (distraction/ ant/ post)             STM/ IASTM             Scar massage             Desensitization             Manual resistance             Quad stretch              Neuro Re-Ed             EDU/ HEP RR MW RR RR RR RR RR RR RR    Quad stretch          Supine with stretch strap 3x30\" holds ea    Hamstring stretch          Seated 3x30\" holds ea b/l  " "  Piriformis stretch          Seated 3x30\" holds ea b/l    STS             Step ups 8\" step 2x5 ea 50% UE support > 6\" step to 12\" step through  X10 ea 2 HHA > lateral step up 3\" step x10 ea 2 HHA 8\" step 2x5 ea 50% UE support ; lateral step up 6\" step x10 ea 2 HHA 8\" step x10 ea 50% UE support; lateral step up 6\" step x12 ea 2 HHA  8\" step x10 ea 50% UE support; lateral step up 6\" step x12 ea 2 HHA        Stair negotiation   X10 entire stair case finger tips on railings          Lateral stepping  Yellow COB  1 lap  Yellow COB 2 laps 2 laps Yellow COB 2 laps yellow COB  2 laps red COB     Diagonal stepping  Yellow COB  1 lap  Yellow COB 2 laps 2 laps Yellow COB 2 laps yellow COB  2 laps red COB     Step downs             Open books             Knee fall outs             SLS  2x30\" 1-2 HHA nv 2x30\" holds no UE support         Cone taps    2 laps ea no UE support taps and walk overs          Hip flexor stretch    With strap 3x30\" holds ea  With strap 3x30\" holds ea          SLB at rebounder    x10 ea 2# ball nv X10 ea 2 # ball  nv nv    SLB with resisted hip flexor strengthening    nv nv 10x5\" holds ea BTB 1 HHA  nv nv    Balance on fitter board      Square 3x30\"        1/2 bosu step ups with contralateral high knee       2X10 ea 1 HHA  nv nv    trampoline       X20 ea UE support marches with 3\" holds > x20 ea UE support quick marches  X20 ea UE support marches with 3\" holds > 2x20 high knees > 2x20 jump                                             Ther Ex             Nustep             bike 7' L2 > 3' L3 2' L2  8' L3 5' L3 >  2.5' L2 >  2.5' L3   10' L2   10' L2    X-ride             treadmill    10' 1% incline 1.3- 1.7 mph 1 UE support 10' 1.5-2% incline at 1.5-2.0 mph   10' 2.5-3% incline @ 2.3 mph 10' 3.5% incline  @ 2.5 mph     Laps around the clinic    2 laps no AD   2 laps no AD       6 MWT             TUG             3 way hip Blue foam x10 ea 1 HHA Blue foam x10 ea 1 HHA  Blue foam x12 ea 1 HHA Blue foam x15 " "ea 1 HHA Black bebeto disc x10 ea 1 HHA Black bebeto disc x10 ea 1 HHA Black bebeto disc x15 ea 1 HHA     Stool scoots     1 lap 1 lap 2 laps  2 laps 2 laps    Leg press Plate 6    DL 55# x20     SL 35# x20  Plate 6    DL 55# x20     SL 35# x20  Plate 6    DL 75# x20     SL 45# x20  Plate 3    DL 75# x20    SL 45# x20 Plate 2    DL 85# x20     SL 55# x20     Jumping (chair flat) 15# x20 > 25# x20 nv Plate 2    DL 85# x20     SL 55# x20     Jumping (chair flat)     DL 35# x20     SL 15# x20 ea Plate 2     DL 95# x20     SL 55# x20     Jumping (chair flat)     DL 35# x30     SL 15# x30 ea Plate 2     DL 95# x20     SL 55# x20     Jumping (chair flat)     DL 35# x30 > DL w/ SL land 35# x30 ea    SL 25# x30 ea    Hip hikes 10x5\" holds ea  10x5\" holds ea           Rainbows          1' b/l     Fire hydrant          X20 ea     bridges         X20 hamstring dominant > x20 quad dominant     SL bridge         X10 ea                                            Ther Activity                                       Gait Training             SPC                          Modalities                                                              "

## 2025-01-07 NOTE — PROGRESS NOTES
PT Re-Evaluation     Today's date: 2025  Patient name: Em Koo  : 1970  MRN: [id number]  Referring provider: Zuly Nichole, *  Dx:   Encounter Diagnosis     ICD-10-CM    1. Right hip pain  M25.551       2. S/P orthopedic surgery, follow-up exam  Z09       3. Impaired functional mobility, balance, gait, and endurance  Z74.09       4. Weakness of right hip  R29.898               Start Time: 730  Stop Time: 815  Total time in clinic (min): 45 minutes    Assessment  Impairments: abnormal gait, activity intolerance, impaired balance, impaired physical strength, pain with function, participation limitations, activity limitations and endurance    Assessment details: Em is a 54 y.o. female who continues to present to outpatient physical therapy s/p R hip fracture and ORIF 10/12/24. Since lat RE pt has continued to  make excellent progress with LE strength and has returned to full ROM in all planes. Pt is now ambulating with a SPC only about 10% of the time with more normalized gait mechanics. Primary impairments continue to include strength deficits, decreased tolerance to activity, decreased muscular endurance, impaired balance, and impaired neuromuscular control. They continue to present with the previously stated impairments which limit their ability to perform plymometric activities/ jog/ run without increase in symptoms. Pt was able to complete the 6 MWT and increased distance ambulated by 413 feet. Pt did not require use of an AD to complete the 6 MWT with only gait impairments noted as L trendelenburg gait and decreased stance time on the R LE. Since last RE pt has been able to transition into jumping and basic plymometric exercises to begin return to jogging/ running. Em is currently functioning below their prior level of function and is a good rehab candidate who would benefit from skilled outpatient physical therapy services to allow them to reach their goals and return to PLOF.  "Pt recommended for 2x a week for 4-6 weeks. Pt prognosis for therapy is good. PT discussed POC and goals with patient, patient was agreeable.      Physical therapist assistant (PTA) may be utilized to administer treatments as appropriate and in accordance with [facility] Physical Therapy Practice Act.      Goals  STG: To be completed in 4 weeks from 11/1/24.   1. Em will report pain no worse than 3/10 at worst to indicate decreased pain level and improved participation with activity. (MET)    LTG: To be completed in 8 weeks from 11/1/24.   1. Em will have an improved FOTO score of 54 in order to demonstrate decreased pain with activity, improved functional strength, improved participation with activity, and improved QOL. (In progress)  2. Em will report pain no worse than 1/10 at worst to indicate decreased pain level and improved participation with activity. (MET)  3. Em will tolerate single limb stance for 10 seconds on R leg to allow patient to have increased stance time on R leg during ambulation. (MET)  4. Em will have improved R hip flexion ROM to 120* and R hip extension ROM to 30* in order to indicate normal hip ROM. (MET)  5. Em will have improved R LE strength to 4/5 in with all motions to improve quality of ambulation, transfers, and stair climbing. (MET)    New goals as of 11/29/24 to be met in 4-6 weeks or upon discharge from OPPT:  1. Em will be able to increase her distance ambulated during the 6 MWT by 100 feet with no AD vs. LRAD. (MET)  2. Em will complete the TUG without AD in 12 seconds or less without any pain. (MET)  3. Em will be able to perform 10-12 STS from standard height chair without use of UE support and no increase in pain. (MET)  4. Em will be able to perform 10 squats to an 18\" box without loss of balance. (In progress)    New goals as of 1/9/25 to be met in 4-6 weeks or upon discharge from OPPT:  1. Em will be able to jog on flat " surface for 5-10 minutes without any pain/ gait impairments.  2. Em will be able balance in SLS for 10 seconds without increase in pain or loss of balance.  3. Em will be able to perform 5 single leg squats to a standard height chair without loss of balance or increase in pain.       Plan  Patient would benefit from: PT eval and skilled physical therapy  Planned modality interventions: biofeedback, cryotherapy, thermotherapy: hydrocollator packs, ultrasound and unattended electrical stimulation    Planned therapy interventions: balance, balance/weight bearing training, flexibility, functional ROM exercises, gait training, graded exercise, graded activity, graded motor, transfer training, therapeutic exercise, therapeutic activities, stretching, strengthening, patient education, IASTM, joint mobilization, kinesiology taping, manual therapy, massage, Greco taping, neuromuscular re-education, nerve gliding, patient/caregiver education, postural training, activity modification, ADL retraining, ADL training, abdominal trunk stabilization and home exercise program    Frequency: 2x week  Duration in weeks: 6  Plan of Care beginning date: 1/9/2025  Plan of Care expiration date: 2/20/2025  Treatment plan discussed with: patient        Subjective Evaluation    History of Present Illness  Mechanism of injury: Since starting therapy pt reports she is roughly 50-60% improved. Pt reports she has noticed improvements with ROM and strength but continues to note decreased muscular endurance. Pt denies sleep disturbances due to pain. Pt denies difficulty with STS, bed mobility, stair negotiation (ascending > descending) requiring b/l UE support, stepping into and out of a tub shower, getting into and out of a low car, however all have significantly improved since initial eval. Pt continues to report difficulty with muscle stiffness following fatigue. Em is only able to sit for 1 hour, stand for several hours without  "AD, and walk for about 1 hour without AD before onset of symptoms.   Patient Goals  Patient goals for therapy: decreased pain, improved balance, increased strength, independence with ADLs/IADLs, return to sport/leisure activities and return to work  Patient goal: \"be able to walk without an AD\"(in progress), \"be able to walk for about 1.25 miles with her dog\"(in progress), \"be able to run 1 miles\"(in progress)  Pain  Current pain ratin  At best pain ratin  At worst pain ratin  Location: over the iliopsoas  Quality: tight, pressure, burning, dull ache, discomfort, pulling and squeezing  Relieving factors: medications, ice, change in position and rest (tylenol 500 mg prn, massage)  Aggravating factors: sitting, standing, walking, stair climbing and running  Progression: improved    Social Support  Exterior steps/ramp assessed: 2 RICKY no railing.  Interior stairs assessed: 1 flight of stairs to the second floor and basement.   Lives in: multiple-level home  Lives with: spouse (dog)    Employment status: working (currently on leave; pt works on a [employer])  Hand dominance: right  Exercise history: run, bike, walking the dog, go to the gym, swimming          Objective     Active Range of Motion   Left Hip   Normal active range of motion    Right Hip   Normal active range of motion    Strength/Myotome Testing     Left Hip   Planes of Motion   Flexion: 4  Abduction: 4+  Adduction: 4+    Right Hip   Planes of Motion   Flexion: 4  Abduction: 4+  Adduction: 4    Left Knee   Flexion: 5  Extension: 5    Right Knee   Flexion: 5  Extension: 5    Left Ankle/Foot   Dorsiflexion: 5    Right Ankle/Foot   Dorsiflexion: 5    Ambulation   Weight-Bearing Status   Weight-Bearing Status (Left): full weight bearing   Weight-Bearing Status (Right): full weight-bearing    Assistive device used: single point cane and none (Only using SPC about 10% of the time)    Ambulation: Level Surfaces   Ambulation with assistive device: " independent  Ambulation without assistive device: independent    Ambulation: Stairs   Ascend stairs: independent  Pattern: reciprocal  Railings: one rail  Descend stairs: independent  Pattern: reciprocal  Railings: one rail    Observational Gait   Left stance time, left step length and right step length within functional limits. Decreased right stance time.   Left foot contact pattern: heel to toe  Right foot contact pattern: heel to toe    Functional Assessment        Comments  RE: 24  - TU seconds from standard height chair without b/l UE support; ambulated with SPC  - 30 STS: x9 from standard height chair without b/l UE support  - 6 MWT: 883 ft; pain 6/10 R low back; RPE 3/10; ambulated with SPC antalgic gait noted around 4 minutes    RE: 25  - TUG: 10 seconds from standard height chair without b/l UE support w/o AD  - 30 STS: x11 from standard height chair without b/l UE support  - 6 MWT: 1296 ft; pain 1/10 noting stiffness in the R hip flexor; RPE 2/10               Precautions: standard precautions, anxiety, chronic paranoid schizophrenia     Access Code: [email or web address]  URL: [email or web address]  Date: 2025  Prepared by: Cherie Monterroso    Exercises  - Supine Diaphragmatic Breathing  - 2 x daily - 7 x weekly - 1 sets - 10 reps  - Supine Transversus Abdominis Bracing - Hands on Stomach  - 2 x daily - 7 x weekly - 1 sets - 10 reps - 5 sec  hold  - Supine Lower Trunk Rotation  - 2 x daily - 7 x weekly - 1 sets - 10 reps - 5 sec hold  - Supine Heel Slide with Strap  - 2 x daily - 7 x weekly - 1 sets - 15 reps - 5-10 sec hold  - Supine Bridge  - 2 x daily - 7 x weekly - 2 sets - 10 reps  - Supine Active Straight Leg Raise  - 2 x daily - 7 x weekly - 1 sets - 10 reps  - Sidelying Thoracic Rotation with Open Book  - 2 x daily - 7 x weekly - 1 sets - 10 reps - 10 sec  hold  - Seated Hip Abduction with Resistance  - 2 x daily - 7 x weekly - 1 sets - 15 reps - 5-10 sec hold  - Seated Hip  "Adduction Isometrics with Ball  - 2 x daily - 7 x weekly - 1 sets - 15 reps - 5-10 sec hold  - Seated March  - 2 x daily - 7 x weekly - 1 sets - 10 reps  - Seated Long Arc Quad  - 2 x daily - 7 x weekly - 1 sets - 10 reps - 5 sec hold  - Standing Hip Flexion with Counter Support  - 2 x daily - 7 x weekly - 1 sets - 10 reps  - Standing Hip Abduction with Counter Support  - 2 x daily - 7 x weekly - 1 sets - 10 reps  - Standing Hip Extension with Counter Support  - 2 x daily - 7 x weekly - 1 sets - 10 reps  - Standing March with Counter Support  - 2 x daily - 7 x weekly - 1 sets - 10 reps  - Hip Hiking on Step  - 2 x daily - 7 x weekly - 1 sets - 10 reps  - Runner's Climb  - 2 x daily - 7 x weekly - 1 sets - 10 reps  - Quadruped Fire Hydrant  - 2 x daily - 7 x weekly - 1 sets - 10-20 reps  - Supine Bridge  - 2 x daily - 7 x weekly - 1 sets - 10-20 reps  - Single Leg Bridge  - 2 x daily - 7 x weekly - 1 sets - 10-20 reps        Date: 1/9 12/23 12/26 12/30 1/2 1/6 1/7   Visit #: 21    15 16 17 18 19 20   Manuals             PROM/ stretching             Hip mobs (distraction/ ant/ post)             STM/ IASTM STM/ trigger point release of the R iliopsoas             Scar massage             Desensitization             Manual resistance             Quad stretch              Neuro Re-Ed             EDU/ HEP RR HEP and self massage    RR RR RR RR RR RR   Quad stretch          Supine with stretch strap 3x30\" holds ea    Hamstring stretch          Seated 3x30\" holds ea b/l    Piriformis stretch          Seated 3x30\" holds ea b/l    STS             Step ups     8\" step x10 ea 50% UE support; lateral step up 6\" step x12 ea 2 HHA        Stair negotiation             Lateral stepping     2 laps Yellow COB 2 laps yellow COB  2 laps red COB     Diagonal stepping     2 laps Yellow COB 2 laps yellow COB  2 laps red COB     Step downs             Open books             Knee fall outs             SLS             Cone taps            " "  Hip flexor stretch              SLB at rebounder     nv X10 ea 2 # ball  nv nv    SLB with resisted hip flexor strengthening     nv 10x5\" holds ea BTB 1 HHA  nv nv    Balance on fitter board      Square 3x30\"        1/2 bosu step ups with contralateral high knee       2X10 ea 1 HHA  nv nv    trampoline       X20 ea UE support marches with 3\" holds > x20 ea UE support quick marches  X20 ea UE support marches with 3\" holds > 2x20 high knees > 2x20 jump                                             Ther Ex             Nustep             bike      10' L2   10' L2    X-ride             treadmill 10' 4% incline  @ 2.6 mph    10' 1.5-2% incline at 1.5-2.0 mph   10' 2.5-3% incline @ 2.3 mph 10' 3.5% incline  @ 2.5 mph     Laps around the clinic       2 laps no AD       6 MWT             TUG             3 way hip     Blue foam x15 ea 1 HHA Black bebeto disc x10 ea 1 HHA Black bebeto disc x10 ea 1 HHA Black bebeto disc x15 ea 1 HHA     Stool scoots     1 lap 1 lap 2 laps  2 laps 2 laps    Leg press     Plate 2    DL 85# x20     SL 55# x20     Jumping (chair flat) 15# x20 > 25# x20 nv Plate 2    DL 85# x20     SL 55# x20     Jumping (chair flat)     DL 35# x20     SL 15# x20 ea Plate 2     DL 95# x20     SL 55# x20     Jumping (chair flat)     DL 35# x30     SL 15# x30 ea Plate 2     DL 95# x20     SL 55# x20     Jumping (chair flat)     DL 35# x30 > DL w/ SL land 35# x30 ea    SL 25# x30 ea    Hip hikes             Rainbows          1' b/l     Fire hydrant          X20 ea     bridges         X20 hamstring dominant > x20 quad dominant     SL bridge         X10 ea     Squats  TRX [id number]            Backward lunge TRX straps x10 ea             Lateral lunge TRX straps x10 ea > on 4\" steps 1 UE support x10 ea             Ther Activity                                       Gait Training             SPC                          Modalities                                              "

## 2025-01-09 ENCOUNTER — EVALUATION (OUTPATIENT)
Age: 55
End: 2025-01-09
Payer: MEDICARE

## 2025-01-09 DIAGNOSIS — R29.898 WEAKNESS OF RIGHT HIP: ICD-10-CM

## 2025-01-09 DIAGNOSIS — Z74.09 IMPAIRED FUNCTIONAL MOBILITY, BALANCE, GAIT, AND ENDURANCE: ICD-10-CM

## 2025-01-09 DIAGNOSIS — M25.551 RIGHT HIP PAIN: Primary | ICD-10-CM

## 2025-01-09 DIAGNOSIS — Z09 S/P ORTHOPEDIC SURGERY, FOLLOW-UP EXAM: ICD-10-CM

## 2025-01-09 PROCEDURE — 97164 PT RE-EVAL EST PLAN CARE: CPT

## 2025-01-09 PROCEDURE — 97140 MANUAL THERAPY 1/> REGIONS: CPT

## 2025-01-09 PROCEDURE — 97112 NEUROMUSCULAR REEDUCATION: CPT

## 2025-01-09 PROCEDURE — 97110 THERAPEUTIC EXERCISES: CPT

## 2025-01-09 RX ORDER — BENZTROPINE MESYLATE 0.5 MG/1
0.5 TABLET ORAL 2 TIMES DAILY
COMMUNITY

## 2025-01-13 ENCOUNTER — OFFICE VISIT (OUTPATIENT)
Age: 55
End: 2025-01-13
Payer: MEDICARE

## 2025-01-13 DIAGNOSIS — Z09 S/P ORTHOPEDIC SURGERY, FOLLOW-UP EXAM: ICD-10-CM

## 2025-01-13 DIAGNOSIS — R29.898 WEAKNESS OF RIGHT HIP: ICD-10-CM

## 2025-01-13 DIAGNOSIS — M25.551 RIGHT HIP PAIN: Primary | ICD-10-CM

## 2025-01-13 DIAGNOSIS — Z74.09 IMPAIRED FUNCTIONAL MOBILITY, BALANCE, GAIT, AND ENDURANCE: ICD-10-CM

## 2025-01-13 PROCEDURE — 97140 MANUAL THERAPY 1/> REGIONS: CPT

## 2025-01-13 PROCEDURE — 97110 THERAPEUTIC EXERCISES: CPT

## 2025-01-13 PROCEDURE — 97112 NEUROMUSCULAR REEDUCATION: CPT

## 2025-01-13 NOTE — PROGRESS NOTES
Daily Note     Today's date: 2025  Patient name: Em Koo  : 1970  MRN: [id number]  Referring provider: Zuly Nichole, *  Dx:   Encounter Diagnosis     ICD-10-CM    1. Right hip pain  M25.551       2. S/P orthopedic surgery, follow-up exam  Z09       3. Impaired functional mobility, balance, gait, and endurance  Z74.09       4. Weakness of right hip  R29.898           Start Time: 730  Stop Time: 815  Total time in clinic (min): 45 minutes    Subjective: Pt reports she has mild discomfort along her iliopsoas. Pt reports continued compliance with HEP and denies any questions or concerns at this time.      Objective: See treatment diary below      Assessment: Pt performed Elliptical aerobic exercises to increase blood flow to the area being treated, prepare the muscles for strength training and stretching, improve overall tolerance to activity, and aerobic endurance. Pt did well with session as outlined. Pt was unable to perform skipping without demonstration and discussion of task. Following demonstration of skipping pt was able to perform however, she had minimal lift off with the R LE and decreased stance time. Pt was able to DL jumping in the medial/ lateral and anterior/posterior direction with symmetrical loading and landing. Pt did require verbal cueing from therapist for lateral squats to prevent medial knee lateral migration b/l. Pt demonstrated improved form and LE strength with lunges and squats. Pt continues to benefit from physical therapy in order to continue progressing towards goals as outlined and return to PLOF. Will continue to modify and advance current POC based on overall progress and symptom irritability.       Plan: Continue per plan of care.  Progress treatment as tolerated.       Precautions: standard precautions, anxiety, chronic paranoid schizophrenia     Access Code: [email or web address]  URL: [email or web address]  Date: 2025  Prepared by: Cherie  Loc    Exercises  - Supine Diaphragmatic Breathing  - 2 x daily - 7 x weekly - 1 sets - 10 reps  - Supine Transversus Abdominis Bracing - Hands on Stomach  - 2 x daily - 7 x weekly - 1 sets - 10 reps - 5 sec  hold  - Supine Lower Trunk Rotation  - 2 x daily - 7 x weekly - 1 sets - 10 reps - 5 sec hold  - Supine Heel Slide with Strap  - 2 x daily - 7 x weekly - 1 sets - 15 reps - 5-10 sec hold  - Supine Bridge  - 2 x daily - 7 x weekly - 2 sets - 10 reps  - Supine Active Straight Leg Raise  - 2 x daily - 7 x weekly - 1 sets - 10 reps  - Sidelying Thoracic Rotation with Open Book  - 2 x daily - 7 x weekly - 1 sets - 10 reps - 10 sec  hold  - Seated Hip Abduction with Resistance  - 2 x daily - 7 x weekly - 1 sets - 15 reps - 5-10 sec hold  - Seated Hip Adduction Isometrics with Ball  - 2 x daily - 7 x weekly - 1 sets - 15 reps - 5-10 sec hold  - Seated March  - 2 x daily - 7 x weekly - 1 sets - 10 reps  - Seated Long Arc Quad  - 2 x daily - 7 x weekly - 1 sets - 10 reps - 5 sec hold  - Standing Hip Flexion with Counter Support  - 2 x daily - 7 x weekly - 1 sets - 10 reps  - Standing Hip Abduction with Counter Support  - 2 x daily - 7 x weekly - 1 sets - 10 reps  - Standing Hip Extension with Counter Support  - 2 x daily - 7 x weekly - 1 sets - 10 reps  - Standing March with Counter Support  - 2 x daily - 7 x weekly - 1 sets - 10 reps  - Hip Hiking on Step  - 2 x daily - 7 x weekly - 1 sets - 10 reps  - Runner's Climb  - 2 x daily - 7 x weekly - 1 sets - 10 reps  - Quadruped Fire Hydrant  - 2 x daily - 7 x weekly - 1 sets - 10-20 reps  - Supine Bridge  - 2 x daily - 7 x weekly - 1 sets - 10-20 reps  - Single Leg Bridge  - 2 x daily - 7 x weekly - 1 sets - 10-20 reps        Date: 1/9 1/13 12/23 12/26 12/30 1/2 1/6 1/7   Visit #: 21 22   15 16 17 18 19 20   Manuals             PROM/ stretching             Hip mobs (distraction/ ant/ post)             STM/ IASTM STM/ trigger point release of the R iliopsoas  STM/  "trigger point release of the R iliopsoas            Scar massage             Desensitization             Manual resistance             Quad stretch              Neuro Re-Ed             EDU/ HEP RR HEP and self massage RR   RR RR RR RR RR RR   Quad stretch          Supine with stretch strap 3x30\" holds ea    Hamstring stretch          Seated 3x30\" holds ea b/l    Piriformis stretch          Seated 3x30\" holds ea b/l    STS             Step ups     8\" step x10 ea 50% UE support; lateral step up 6\" step x12 ea 2 HHA        Stair negotiation             Lateral stepping     2 laps Yellow COB 2 laps yellow COB  2 laps red COB     Diagonal stepping     2 laps Yellow COB 2 laps yellow COB  2 laps red COB     Step downs             Open books             Knee fall outs             SLS             Cone taps              Hip flexor stretch              SLB at rebounder  2x10 ea 2# ball   nv X10 ea 2 # ball  nv nv    SLB with resisted hip flexor strengthening  nv   nv 10x5\" holds ea BTB 1 HHA  nv nv    Balance on fitter board      Square 3x30\"        1/2 bosu step ups with contralateral high knee  nv     2X10 ea 1 HHA  nv nv    trampoline       X20 ea UE support marches with 3\" holds > x20 ea UE support quick marches  X20 ea UE support marches with 3\" holds > 2x20 high knees > 2x20 jump      skipping  2 short laps           Jumping (ML/AP)  30\" ea                                                  Ther Ex             Nustep             bike      10' L2   10' L2    X-ride             Elliptical   10' L2-10           treadmill 10' 4% incline  @ 2.6 mph 5' 1% incline  3.4 mph fast walking   10' 1.5-2% incline at 1.5-2.0 mph   10' 2.5-3% incline @ 2.3 mph 10' 3.5% incline  @ 2.5 mph     Laps around the clinic       2 laps no AD       6 MWT             TUG             3 way hip     Blue foam x15 ea 1 HHA Black bbeeto disc x10 ea 1 HHA Black bebeto disc x10 ea 1 HHA Black bebteo disc x15 ea 1 HHA     Stool scoots  2 laps   1 lap 1 lap 2 " "laps  2 laps 2 laps    Leg press     Plate 2    DL 85# x20     SL 55# x20     Jumping (chair flat) 15# x20 > 25# x20 nv Plate 2    DL 85# x20     SL 55# x20     Jumping (chair flat)     DL 35# x20     SL 15# x20 ea Plate 2     DL 95# x20     SL 55# x20     Jumping (chair flat)     DL 35# x30     SL 15# x30 ea Plate 2     DL 95# x20     SL 55# x20     Jumping (chair flat)     DL 35# x30 > DL w/ SL land 35# x30 ea    SL 25# x30 ea    Hip hikes             Rainbows          1' b/l     Fire hydrant          X20 ea     bridges         X20 hamstring dominant > x20 quad dominant     SL bridge         X10 ea     Squats  TRX [id number] TRX straps x10           Backward lunge TRX straps x10 ea  TRX straps x10 ea            Lateral lunge TRX straps x10 ea > on 4\" steps 1 UE support x10 ea  TRX straps x10 ea            Ther Activity                                       Gait Training             SPC                          Modalities                                              "

## 2025-01-16 ENCOUNTER — APPOINTMENT (OUTPATIENT)
Age: 55
End: 2025-01-16
Payer: MEDICARE

## 2025-01-20 ENCOUNTER — OFFICE VISIT (OUTPATIENT)
Age: 55
End: 2025-01-20
Payer: MEDICARE

## 2025-01-20 DIAGNOSIS — R29.898 WEAKNESS OF RIGHT HIP: ICD-10-CM

## 2025-01-20 DIAGNOSIS — Z09 S/P ORTHOPEDIC SURGERY, FOLLOW-UP EXAM: ICD-10-CM

## 2025-01-20 DIAGNOSIS — Z74.09 IMPAIRED FUNCTIONAL MOBILITY, BALANCE, GAIT, AND ENDURANCE: ICD-10-CM

## 2025-01-20 DIAGNOSIS — M25.551 RIGHT HIP PAIN: Primary | ICD-10-CM

## 2025-01-20 PROCEDURE — 97140 MANUAL THERAPY 1/> REGIONS: CPT

## 2025-01-20 PROCEDURE — 97110 THERAPEUTIC EXERCISES: CPT

## 2025-01-20 PROCEDURE — 97112 NEUROMUSCULAR REEDUCATION: CPT

## 2025-01-20 NOTE — PROGRESS NOTES
Daily Note     Today's date: 2025  Patient name: Em Koo  : 1970  MRN: [id number]  Referring provider: Zuly Nichole, *  Dx:   Encounter Diagnosis     ICD-10-CM    1. Right hip pain  M25.551       2. S/P orthopedic surgery, follow-up exam  Z09       3. Impaired functional mobility, balance, gait, and endurance  Z74.09       4. Weakness of right hip  R29.898           Start Time: 733  Stop Time: 816  Total time in clinic (min): 43 minutes    Subjective: Pt reports 2/10 pain in her hip flexors. Pt reports compliance with her HEP and denies any questions or concerns at this time.      Objective: See treatment diary below      Assessment: Pt performed Elliptical aerobic exercises to increase blood flow to the area being treated, prepare the muscles for strength training and stretching, improve overall tolerance to activity, and aerobic endurance. Pt was able to perform 2 short periods of jogging on the treadmill before form fatigue was noted around 30 seconds; pt had fairly symmetrical step length. Pt had difficulty with SL squat R>L and required an increased surface of 65 cm to be able to complete on the R LE. Utilized close supervision throughout session for safety. Pt fatigued quickly. Pt continued to benefit from STM and use of a massage gun over the R hip flexors. Pt continues to benefit from physical therapy in order to continue progressing towards goals as outlined and return to PLOF. Will continue to modify and advance current POC based on overall progress and symptom irritability.       Plan: Continue per plan of care.  Progress treatment as tolerated.       Precautions: standard precautions, anxiety, chronic paranoid schizophrenia     Access Code: [email or web address]  URL: [email or web address]  Date: 2025  Prepared by: Cherie Monterroso    Exercises  - Supine Diaphragmatic Breathing  - 2 x daily - 7 x weekly - 1 sets - 10 reps  - Supine Transversus Abdominis Bracing - Hands  on Stomach  - 2 x daily - 7 x weekly - 1 sets - 10 reps - 5 sec  hold  - Supine Lower Trunk Rotation  - 2 x daily - 7 x weekly - 1 sets - 10 reps - 5 sec hold  - Supine Heel Slide with Strap  - 2 x daily - 7 x weekly - 1 sets - 15 reps - 5-10 sec hold  - Supine Bridge  - 2 x daily - 7 x weekly - 2 sets - 10 reps  - Supine Active Straight Leg Raise  - 2 x daily - 7 x weekly - 1 sets - 10 reps  - Sidelying Thoracic Rotation with Open Book  - 2 x daily - 7 x weekly - 1 sets - 10 reps - 10 sec  hold  - Seated Hip Abduction with Resistance  - 2 x daily - 7 x weekly - 1 sets - 15 reps - 5-10 sec hold  - Seated Hip Adduction Isometrics with Ball  - 2 x daily - 7 x weekly - 1 sets - 15 reps - 5-10 sec hold  - Seated March  - 2 x daily - 7 x weekly - 1 sets - 10 reps  - Seated Long Arc Quad  - 2 x daily - 7 x weekly - 1 sets - 10 reps - 5 sec hold  - Standing Hip Flexion with Counter Support  - 2 x daily - 7 x weekly - 1 sets - 10 reps  - Standing Hip Abduction with Counter Support  - 2 x daily - 7 x weekly - 1 sets - 10 reps  - Standing Hip Extension with Counter Support  - 2 x daily - 7 x weekly - 1 sets - 10 reps  - Standing March with Counter Support  - 2 x daily - 7 x weekly - 1 sets - 10 reps  - Hip Hiking on Step  - 2 x daily - 7 x weekly - 1 sets - 10 reps  - Runner's Climb  - 2 x daily - 7 x weekly - 1 sets - 10 reps  - Quadruped Fire Hydrant  - 2 x daily - 7 x weekly - 1 sets - 10-20 reps  - Supine Bridge  - 2 x daily - 7 x weekly - 1 sets - 10-20 reps  - Single Leg Bridge  - 2 x daily - 7 x weekly - 1 sets - 10-20 reps        Date: 1/9 1/13 1/20 12/23 12/26 12/30 1/2 1/6 1/7   Visit #: 21 22 23  15 16 17 18 19 20   Manuals             PROM/ stretching             Hip mobs (distraction/ ant/ post)             STM/ IASTM STM/ trigger point release of the R iliopsoas  STM/ trigger point release of the R iliopsoas  STM/ trigger point release of the R iliopsoas and use of massage gun          Scar massage            "  Desensitization             Manual resistance             Quad stretch              Neuro Re-Ed             EDU/ HEP RR HEP and self massage RR RR  RR RR RR RR RR RR   Quad stretch          Supine with stretch strap 3x30\" holds ea    Hamstring stretch          Seated 3x30\" holds ea b/l    Piriformis stretch          Seated 3x30\" holds ea b/l    STS             Step ups     8\" step x10 ea 50% UE support; lateral step up 6\" step x12 ea 2 HHA        Stair negotiation             Lateral stepping     2 laps Yellow COB 2 laps yellow COB  2 laps red COB     Diagonal stepping     2 laps Yellow COB 2 laps yellow COB  2 laps red COB     Step downs             Open books             Knee fall outs             SLS             Cone taps              Hip flexor stretch              SLB at rebounder  2x10 ea 2# ball X10 ea 2# ball > 90* offset x5 ea  nv X10 ea 2 # ball  nv nv    SLB with resisted hip flexor strengthening  nv   nv 10x5\" holds ea BTB 1 HHA  nv nv    Balance on fitter board      Square 3x30\"        1/2 bosu step ups with contralateral high knee  nv nv    2X10 ea 1 HHA  nv nv    trampoline   High knees 1' > wide to narrow jump 1'    X20 ea UE support marches with 3\" holds > x20 ea UE support quick marches  X20 ea UE support marches with 3\" holds > 2x20 high knees > 2x20 jump      skipping  2 short laps 2 laps          Jumping (ML/AP)  30\" ea nv          Box jump   6\" off x10 > on x10                                    Ther Ex             Nustep             bike      10' L2   10' L2    X-ride             Elliptical   10' L2-10 8' L12          treadmill 10' 4% incline  @ 2.6 mph 5' 1% incline  3.4 mph fast walking 5' 1% incline 3.7-4 speed walking - jog  10' 1.5-2% incline at 1.5-2.0 mph   10' 2.5-3% incline @ 2.3 mph 10' 3.5% incline  @ 2.5 mph     Laps around the clinic       2 laps no AD       6 MWT             TUG             3 way hip     Blue foam x15 ea 1 HHA Black bebeto disc x10 ea 1 HHA Black bebeto disc x10 ea " "1 HHA Black bebeto disc x15 ea 1 HHA     Stool scoots  2 laps   1 lap 1 lap 2 laps  2 laps 2 laps    Leg press     Plate 2    DL 85# x20     SL 55# x20     Jumping (chair flat) 15# x20 > 25# x20 nv Plate 2    DL 85# x20     SL 55# x20     Jumping (chair flat)     DL 35# x20     SL 15# x20 ea Plate 2     DL 95# x20     SL 55# x20     Jumping (chair flat)     DL 35# x30     SL 15# x30 ea Plate 2     DL 95# x20     SL 55# x20     Jumping (chair flat)     DL 35# x30 > DL w/ SL land 35# x30 ea    SL 25# x30 ea    Hip hikes             Rainbows          1' b/l     Fire hydrant          X20 ea     bridges         X20 hamstring dominant > x20 quad dominant     SL bridge         X10 ea     Squats  TRX [id number] TRX straps x10           Backward lunge TRX straps x10 ea  TRX straps x10 ea            Lateral lunge TRX straps x10 ea > on 4\" steps 1 UE support x10 ea  TRX straps x10 ea            SL squat     L LE standard height chair x5 > R LE 65 cm x5 close spervision for safety                                     Ther Activity                                       Gait Training             SPC                          Modalities                                                "

## 2025-01-23 ENCOUNTER — OFFICE VISIT (OUTPATIENT)
Age: 55
End: 2025-01-23
Payer: MEDICARE

## 2025-01-23 DIAGNOSIS — Z74.09 IMPAIRED FUNCTIONAL MOBILITY, BALANCE, GAIT, AND ENDURANCE: ICD-10-CM

## 2025-01-23 DIAGNOSIS — M25.551 RIGHT HIP PAIN: Primary | ICD-10-CM

## 2025-01-23 DIAGNOSIS — R29.898 WEAKNESS OF RIGHT HIP: ICD-10-CM

## 2025-01-23 DIAGNOSIS — Z09 S/P ORTHOPEDIC SURGERY, FOLLOW-UP EXAM: ICD-10-CM

## 2025-01-23 PROCEDURE — 97112 NEUROMUSCULAR REEDUCATION: CPT

## 2025-01-23 PROCEDURE — 97110 THERAPEUTIC EXERCISES: CPT

## 2025-01-23 PROCEDURE — 97140 MANUAL THERAPY 1/> REGIONS: CPT

## 2025-01-23 NOTE — PROGRESS NOTES
Daily Note     Today's date: 2025  Patient name: Em Koo  : 1970  MRN: [id number]  Referring provider: Zuly Nichole, *  Dx:   Encounter Diagnosis     ICD-10-CM    1. Right hip pain  M25.551       2. S/P orthopedic surgery, follow-up exam  Z09       3. Impaired functional mobility, balance, gait, and endurance  Z74.09       4. Weakness of right hip  R29.898           Start Time: 730  Stop Time: 815  Total time in clinic (min): 45 minutes    Subjective: Pt reports that she felt sore after last session but noted she shoveled and went to work. Pt reports she is compliant with her and denies any questions or concerns at this time.      Objective: See treatment diary below      Assessment: Pt performed Elliptical aerobic exercises to increase blood flow to the area being treated, prepare the muscles for strength training and stretching, improve overall tolerance to activity, and aerobic endurance. Pt continues to do well with POC as outlined. Pt had greatest difficulty today with SL squats and medial/ lateral jumps. Due to difficulty with SL squats performed in a B stance when standing on the R LE to improve balance and limit medial and lateral knee migration. Continued to perform STM via massage gun and hip flexor stretch to address tightness/ discomfort. Pt continues to benefit from physical therapy in order to continue progressing towards goals as outlined and return to PLOF. Will continue to modify and advance current POC based on overall progress and symptom irritability.       Plan: Continue per plan of care.  Progress treatment as tolerated.       Precautions: standard precautions, anxiety, chronic paranoid schizophrenia     Access Code: [email or web address]  URL: [email or web address]  Date: 2025  Prepared by: Cherie Monterroso    Exercises  - Supine Diaphragmatic Breathing  - 2 x daily - 7 x weekly - 1 sets - 10 reps  - Supine Transversus Abdominis Bracing - Hands on Stomach  - 2  x daily - 7 x weekly - 1 sets - 10 reps - 5 sec  hold  - Supine Lower Trunk Rotation  - 2 x daily - 7 x weekly - 1 sets - 10 reps - 5 sec hold  - Supine Heel Slide with Strap  - 2 x daily - 7 x weekly - 1 sets - 15 reps - 5-10 sec hold  - Supine Bridge  - 2 x daily - 7 x weekly - 2 sets - 10 reps  - Supine Active Straight Leg Raise  - 2 x daily - 7 x weekly - 1 sets - 10 reps  - Sidelying Thoracic Rotation with Open Book  - 2 x daily - 7 x weekly - 1 sets - 10 reps - 10 sec  hold  - Seated Hip Abduction with Resistance  - 2 x daily - 7 x weekly - 1 sets - 15 reps - 5-10 sec hold  - Seated Hip Adduction Isometrics with Ball  - 2 x daily - 7 x weekly - 1 sets - 15 reps - 5-10 sec hold  - Seated March  - 2 x daily - 7 x weekly - 1 sets - 10 reps  - Seated Long Arc Quad  - 2 x daily - 7 x weekly - 1 sets - 10 reps - 5 sec hold  - Standing Hip Flexion with Counter Support  - 2 x daily - 7 x weekly - 1 sets - 10 reps  - Standing Hip Abduction with Counter Support  - 2 x daily - 7 x weekly - 1 sets - 10 reps  - Standing Hip Extension with Counter Support  - 2 x daily - 7 x weekly - 1 sets - 10 reps  - Standing March with Counter Support  - 2 x daily - 7 x weekly - 1 sets - 10 reps  - Hip Hiking on Step  - 2 x daily - 7 x weekly - 1 sets - 10 reps  - Runner's Climb  - 2 x daily - 7 x weekly - 1 sets - 10 reps  - Quadruped Fire Hydrant  - 2 x daily - 7 x weekly - 1 sets - 10-20 reps  - Supine Bridge  - 2 x daily - 7 x weekly - 1 sets - 10-20 reps  - Single Leg Bridge  - 2 x daily - 7 x weekly - 1 sets - 10-20 reps        Date: 1/9 1/13 1/20 1/23         Visit #: 21 22 23 24         Manuals             PROM/ stretching             Hip mobs (distraction/ ant/ post)             STM/ IASTM STM/ trigger point release of the R iliopsoas  STM/ trigger point release of the R iliopsoas  STM/ trigger point release of the R iliopsoas and use of massage gun STM/ trigger point release of the R iliopsoas and use of massage gun        "  Scar massage             Desensitization             Manual resistance             Quad stretch              Neuro Re-Ed             EDU/ HEP RR HEP and self massage RR RR RR         Quad stretch              Hamstring stretch              Piriformis stretch              STS             Step ups             Stair negotiation             Lateral stepping    Red COB 2 laps         Diagonal stepping    Red COB 2 laps         Step downs             Open books             Knee fall outs             SLS             Cone taps              Hip flexor stretch              SLB at rebounder  2x10 ea 2# ball X10 ea 2# ball > 90* offset x5 ea nv         SLB with resisted hip flexor strengthening  nv  nv         Balance on fitter board             1/2 bosu step ups with contralateral high knee  nv nv X10 ea 1 HHA         trampoline   High knees 1' > wide to narrow jump 1'          skipping  2 short laps 2 laps          Jumping (ML/AP)  30\" ea nv 30\" ML         Box jump   6\" off x10 > on x10 6\" off x10 > on x10                                   Ther Ex             Nustep             bike             X-ride             Elliptical   10' L2-10 8' L12 10' L12         treadmill 10' 4% incline  @ 2.6 mph 5' 1% incline  3.4 mph fast walking 5' 1% incline 3.7-4 speed walking - jog          Laps around the clinic             6 MWT             TUG             3 way hip             Stool scoots  2 laps  2 laps         Leg press             Hip hikes             Rainbows              Fire hydrant              bridges             SL bridge             Squats  TRX [id number] TRX straps x10  X10 to 18\" box         Backward lunge TRX straps x10 ea  TRX straps x10 ea            Lateral lunge TRX straps x10 ea > on 4\" steps 1 UE support x10 ea  TRX straps x10 ea            SL squat     L LE standard height chair x5 > R LE 65 cm x5 close spervision for safety  L LE standard height chair 2x5 > R LE 65 cm 2x5 in B stance                               "       Ther Activity                                       Gait Training             SPC                          Modalities

## 2025-01-27 ENCOUNTER — OFFICE VISIT (OUTPATIENT)
Age: 55
End: 2025-01-27
Payer: MEDICARE

## 2025-01-27 DIAGNOSIS — Z09 S/P ORTHOPEDIC SURGERY, FOLLOW-UP EXAM: ICD-10-CM

## 2025-01-27 DIAGNOSIS — R29.898 WEAKNESS OF RIGHT HIP: ICD-10-CM

## 2025-01-27 DIAGNOSIS — Z74.09 IMPAIRED FUNCTIONAL MOBILITY, BALANCE, GAIT, AND ENDURANCE: ICD-10-CM

## 2025-01-27 DIAGNOSIS — M25.551 RIGHT HIP PAIN: Primary | ICD-10-CM

## 2025-01-27 PROCEDURE — 97112 NEUROMUSCULAR REEDUCATION: CPT

## 2025-01-27 PROCEDURE — 97110 THERAPEUTIC EXERCISES: CPT

## 2025-01-27 NOTE — PROGRESS NOTES
"Daily Note     Today's date: 2025  Patient name: Em Koo  : 1970  MRN: [id number]  Referring provider: Zuly Nichole, *  Dx:   Encounter Diagnosis     ICD-10-CM    1. Right hip pain  M25.551       2. S/P orthopedic surgery, follow-up exam  Z09       3. Impaired functional mobility, balance, gait, and endurance  Z74.09       4. Weakness of right hip  R29.898           Start Time: 730  Stop Time: 815  Total time in clinic (min): 45 minutes    Subjective: Pt reports she felt good following last session. Notes last night she had sharp stabbing pain in the R glute, improved with heat, massage gun and TENS. Pt denies any pain today.      Objective: See treatment diary below      Assessment: Pt performed treadmill aerobic exercises to increase blood flow to the area being treated, prepare the muscles for strength training and stretching, improve overall tolerance to activity, and aerobic endurance. Pt completed intervals of speed walking and jogging, did require use of b/l UE support on handles due to fear of falling. Pt did well with session as outlined without any adverse reactions. With box squats pt had mild difficulty controlling descent and standing from 18\" box. Pt was able to progress to walking lunges today with good form. Pt continues to fatigue quickly throughout session. Pt continues to benefit from physical therapy in order to continue progressing towards goals as outlined and return to PLOF. Will continue to modify and advance current POC based on overall progress and symptom irritability.       Plan: Continue per plan of care.  Progress treatment as tolerated.       Precautions: standard precautions, anxiety, chronic paranoid schizophrenia     Access Code: [email or web address]  URL: [email or web address]  Date: 2025  Prepared by: Cherie Monterroso    Exercises  - Supine Diaphragmatic Breathing  - 2 x daily - 7 x weekly - 1 sets - 10 reps  - Supine Transversus Abdominis " Bracing - Hands on Stomach  - 2 x daily - 7 x weekly - 1 sets - 10 reps - 5 sec  hold  - Supine Lower Trunk Rotation  - 2 x daily - 7 x weekly - 1 sets - 10 reps - 5 sec hold  - Supine Heel Slide with Strap  - 2 x daily - 7 x weekly - 1 sets - 15 reps - 5-10 sec hold  - Supine Bridge  - 2 x daily - 7 x weekly - 2 sets - 10 reps  - Supine Active Straight Leg Raise  - 2 x daily - 7 x weekly - 1 sets - 10 reps  - Sidelying Thoracic Rotation with Open Book  - 2 x daily - 7 x weekly - 1 sets - 10 reps - 10 sec  hold  - Seated Hip Abduction with Resistance  - 2 x daily - 7 x weekly - 1 sets - 15 reps - 5-10 sec hold  - Seated Hip Adduction Isometrics with Ball  - 2 x daily - 7 x weekly - 1 sets - 15 reps - 5-10 sec hold  - Seated March  - 2 x daily - 7 x weekly - 1 sets - 10 reps  - Seated Long Arc Quad  - 2 x daily - 7 x weekly - 1 sets - 10 reps - 5 sec hold  - Standing Hip Flexion with Counter Support  - 2 x daily - 7 x weekly - 1 sets - 10 reps  - Standing Hip Abduction with Counter Support  - 2 x daily - 7 x weekly - 1 sets - 10 reps  - Standing Hip Extension with Counter Support  - 2 x daily - 7 x weekly - 1 sets - 10 reps  - Standing March with Counter Support  - 2 x daily - 7 x weekly - 1 sets - 10 reps  - Hip Hiking on Step  - 2 x daily - 7 x weekly - 1 sets - 10 reps  - Runner's Climb  - 2 x daily - 7 x weekly - 1 sets - 10 reps  - Quadruped Fire Hydrant  - 2 x daily - 7 x weekly - 1 sets - 10-20 reps  - Supine Bridge  - 2 x daily - 7 x weekly - 1 sets - 10-20 reps  - Single Leg Bridge  - 2 x daily - 7 x weekly - 1 sets - 10-20 reps        Date: 1/9 1/13 1/20 1/23 1/27        Visit #: 21 22 23 24 25        Manuals             PROM/ stretching             Hip mobs (distraction/ ant/ post)             STM/ IASTM STM/ trigger point release of the R iliopsoas  STM/ trigger point release of the R iliopsoas  STM/ trigger point release of the R iliopsoas and use of massage gun STM/ trigger point release of the R  "iliopsoas and use of massage gun         Scar massage             Desensitization             Manual resistance             Quad stretch              Neuro Re-Ed             EDU/ HEP RR HEP and self massage RR RR RR RR        Quad stretch      Supine with stretch strap 3x30\" holds ea         Hamstring stretch              Piriformis stretch              STS             Step ups             Stair negotiation             Lateral stepping    Red COB 2 laps Red COB 2 laps        Diagonal stepping    Red COB 2 laps Red COB 2 laps        SLB at rebounder  2x10 ea 2# ball X10 ea 2# ball > 90* offset x5 ea nv X10 ea 2# ball > 90* offset x5 ea        SLB with resisted hip flexor strengthening  nv  nv nv        Balance on fitter board             1/2 bosu step ups with contralateral high knee  nv nv X10 ea 1 HHA nv        trampoline   High knees 1' > wide to narrow jump 1'          skipping  2 short laps 2 laps          Jumping (ML/AP)  30\" ea nv 30\" ML 30\" ML/AP        Box jump   6\" off x10 > on x10 6\" off x10 > on x10 NV                                  Ther Ex             Elliptical   10' L2-10 8' L12 10' L12         treadmill 10' 4% incline  @ 2.6 mph 5' 1% incline  3.4 mph fast walking 5' 1% incline 3.7-4 speed walking - jog  10' 2.5% incline3.2- 4.1    Backwards walking  5' 5% incline  1 mph         Laps around the clinic             6 MWT             TUG             3 way hip             Stool scoots  2 laps  2 laps 2 laps        Leg press             Hip hikes             Rainbows              Fire hydrant              bridges             SL bridge             Squats  TRX [id number] TRX straps x10  X10 to 18\" box X10 to 18\" box        Backward lunge TRX straps x10 ea  TRX straps x10 ea            Lateral lunge TRX straps x10 ea > on 4\" steps 1 UE support x10 ea  TRX straps x10 ea    nv        Walking lunges     2 laps        SL squat     L LE standard height chair x5 > R LE 65 cm x5 close spervision for safety  L LE " standard height chair 2x5 > R LE 65 cm 2x5 in B stance   nv                                  Ther Activity                                       Gait Training             SPC                          Modalities

## 2025-01-30 ENCOUNTER — OFFICE VISIT (OUTPATIENT)
Age: 55
End: 2025-01-30
Payer: MEDICARE

## 2025-01-30 DIAGNOSIS — Z09 S/P ORTHOPEDIC SURGERY, FOLLOW-UP EXAM: ICD-10-CM

## 2025-01-30 DIAGNOSIS — R29.898 WEAKNESS OF RIGHT HIP: ICD-10-CM

## 2025-01-30 DIAGNOSIS — Z74.09 IMPAIRED FUNCTIONAL MOBILITY, BALANCE, GAIT, AND ENDURANCE: ICD-10-CM

## 2025-01-30 DIAGNOSIS — M25.551 RIGHT HIP PAIN: Primary | ICD-10-CM

## 2025-01-30 PROCEDURE — 97112 NEUROMUSCULAR REEDUCATION: CPT

## 2025-01-30 PROCEDURE — 97110 THERAPEUTIC EXERCISES: CPT

## 2025-01-30 NOTE — PROGRESS NOTES
Daily Note     Today's date: 2025  Patient name: Em Koo  : 1970  MRN: [id number]  Referring provider: Zuly Nichole, *  Dx:   Encounter Diagnosis     ICD-10-CM    1. Right hip pain  M25.551       2. S/P orthopedic surgery, follow-up exam  Z09       3. Impaired functional mobility, balance, gait, and endurance  Z74.09       4. Weakness of right hip  R29.898           Start Time: 730  Stop Time: 815  Total time in clinic (min): 45 minutes    Subjective: Pt reports mild tightness/ discomfort in the R hip flexors. Reports she felt good following her last session.      Objective: See treatment diary below      Assessment: Pt performed treadmill aerobic exercises to increase blood flow to the area being treated, prepare the muscles for strength training and stretching, improve overall tolerance to activity, and aerobic endurance. Pt had visible muscular fatigue with SL squats R> L, via shaking of the R LE. Pt required verbal cueing from therapist to limit Medial drift of the R knee with SL squat. Pt had improved posture and control with walking lunges. Pt fatigued quickly with controlled pogos but had no pain. Pt left session fatigued. Pt continues to benefit from physical therapy in order to continue progressing towards goals as outlined and return to PLOF. Will continue to modify and advance current POC based on overall progress and symptom irritability.       Plan: Continue per plan of care.  Progress treatment as tolerated.       Precautions: standard precautions, anxiety, chronic paranoid schizophrenia     Access Code: [email or web address]  URL: [email or web address]  Date: 2025  Prepared by: Cherie Monterroso    Exercises  - Supine Diaphragmatic Breathing  - 2 x daily - 7 x weekly - 1 sets - 10 reps  - Supine Transversus Abdominis Bracing - Hands on Stomach  - 2 x daily - 7 x weekly - 1 sets - 10 reps - 5 sec  hold  - Supine Lower Trunk Rotation  - 2 x daily - 7 x weekly - 1 sets -  10 reps - 5 sec hold  - Supine Heel Slide with Strap  - 2 x daily - 7 x weekly - 1 sets - 15 reps - 5-10 sec hold  - Supine Bridge  - 2 x daily - 7 x weekly - 2 sets - 10 reps  - Supine Active Straight Leg Raise  - 2 x daily - 7 x weekly - 1 sets - 10 reps  - Sidelying Thoracic Rotation with Open Book  - 2 x daily - 7 x weekly - 1 sets - 10 reps - 10 sec  hold  - Seated Hip Abduction with Resistance  - 2 x daily - 7 x weekly - 1 sets - 15 reps - 5-10 sec hold  - Seated Hip Adduction Isometrics with Ball  - 2 x daily - 7 x weekly - 1 sets - 15 reps - 5-10 sec hold  - Seated March  - 2 x daily - 7 x weekly - 1 sets - 10 reps  - Seated Long Arc Quad  - 2 x daily - 7 x weekly - 1 sets - 10 reps - 5 sec hold  - Standing Hip Flexion with Counter Support  - 2 x daily - 7 x weekly - 1 sets - 10 reps  - Standing Hip Abduction with Counter Support  - 2 x daily - 7 x weekly - 1 sets - 10 reps  - Standing Hip Extension with Counter Support  - 2 x daily - 7 x weekly - 1 sets - 10 reps  - Standing March with Counter Support  - 2 x daily - 7 x weekly - 1 sets - 10 reps  - Hip Hiking on Step  - 2 x daily - 7 x weekly - 1 sets - 10 reps  - Runner's Climb  - 2 x daily - 7 x weekly - 1 sets - 10 reps  - Quadruped Fire Hydrant  - 2 x daily - 7 x weekly - 1 sets - 10-20 reps  - Supine Bridge  - 2 x daily - 7 x weekly - 1 sets - 10-20 reps  - Single Leg Bridge  - 2 x daily - 7 x weekly - 1 sets - 10-20 reps        Date: 1/9 1/13 1/20 1/23 1/27 1/30       Visit #: 21 22 23 24 25 26       Manuals             PROM/ stretching             Hip mobs (distraction/ ant/ post)             STM/ IASTM STM/ trigger point release of the R iliopsoas  STM/ trigger point release of the R iliopsoas  STM/ trigger point release of the R iliopsoas and use of massage gun STM/ trigger point release of the R iliopsoas and use of massage gun         Scar massage             Desensitization             Manual resistance             Quad stretch             "  Neuro Re-Ed             EDU/ HEP RR HEP and self massage RR RR RR RR RR       Quad stretch      Supine with stretch strap 3x30\" holds ea         Hamstring stretch              Piriformis stretch              STS             Step ups             Stair negotiation             Lateral stepping    Red COB 2 laps Red COB 2 laps Blue COB 1 lap       Diagonal stepping    Red COB 2 laps Red COB 2 laps Blue COB 1 lap       SLB at rebounder  2x10 ea 2# ball X10 ea 2# ball > 90* offset x5 ea nv X10 ea 2# ball > 90* offset x5 ea nv       SLB with resisted hip flexor strengthening  nv  nv nv nv       Balance on fitter board             1/2 bosu step ups with contralateral high knee  nv nv X10 ea 1 HHA nv X15 ea fingertip to no UE support       trampoline   High knees 1' > wide to narrow jump 1'          skipping  2 short laps 2 laps          Jumping (ML/AP)  30\" ea nv 30\" ML 30\" ML/AP nv       Box jump   6\" off x10 > on x10 6\" off x10 > on x10 NV nv       Pogos       18\" box x20 ea focusing on eccentric                     Ther Ex             Elliptical   10' L2-10 8' L12 10' L12         treadmill 10' 4% incline  @ 2.6 mph 5' 1% incline  3.4 mph fast walking 5' 1% incline 3.7-4 speed walking - jog  10' 2.5% incline3.2- 4.1    Backwards walking  5' 5% incline  1 mph  10' 4% incline3.1            Laps around the clinic             6 MWT             TUG             3 way hip             Stool scoots  2 laps  2 laps 2 laps 2 laps       Leg press      Plate 4     DL 85# x20     SL 55# x20 ea       Hip hikes             Rainbows              Fire hydrant              bridges             SL bridge             Squats  TRX [id number] TRX straps x10  X10 to 18\" box X10 to 18\" box        Backward lunge TRX straps x10 ea  TRX straps x10 ea            Lateral lunge TRX straps x10 ea > on 4\" steps 1 UE support x10 ea  TRX straps x10 ea    nv TRX straps x10 ea        Walking lunges     2 laps 2 laps       SL squat     L LE standard height " chair x5 > R LE 65 cm x5 close spervision for safety  L LE standard height chair 2x5 > R LE 65 cm 2x5 in B stance   nv L LE 2x5    R LE   Stand DL > eccentric lowering  SL 2x5    Standard height chair                                  Ther Activity                                       Gait Training             SPC                          Modalities

## 2025-05-05 ENCOUNTER — EVALUATION (OUTPATIENT)
Age: 55
End: 2025-05-05
Payer: MEDICARE

## 2025-05-05 DIAGNOSIS — M25.551 RIGHT HIP PAIN: Primary | ICD-10-CM

## 2025-05-05 PROCEDURE — 97162 PT EVAL MOD COMPLEX 30 MIN: CPT

## 2025-05-05 PROCEDURE — 97112 NEUROMUSCULAR REEDUCATION: CPT

## 2025-05-05 PROCEDURE — 97116 GAIT TRAINING THERAPY: CPT

## 2025-05-05 NOTE — LETTER
May 7, 2025    Mal Irene MD  [address] [place] [id number]    Patient: Em Koo   YOB: 1970   Date of Visit: 2025     Encounter Diagnosis     ICD-10-CM    1. Right hip pain  M25.551           Dear Dr. Mal Irene MD:    Thank you for your recent referral of Em Koo. Please review the attached evaluation summary from Em's recent visit.     Please verify that you agree with the plan of care by signing the attached order.     If you have any questions or concerns, please do not hesitate to call.     I sincerely appreciate the opportunity to share in the care of one of your patients and hope to have another opportunity to work with you in the near future.       Sincerely,    Beto Mendez, PT      Referring Provider:      I certify that I have read the below Plan of Care and certify the need for these services furnished under this plan of treatment while under my care.                    Mal Irene MD  [address] [place] [id number]  Via Fax: [phone]               PT Evaluation     Today's date: 2025  Patient name: Em Koo  : 1970  MRN: [id number]  Referring provider: Mal Irene MD  Dx:   Encounter Diagnosis     ICD-10-CM    1. Right hip pain  M25.551           Start Time: 1358  Stop Time: 1455  Total time in clinic (min): 57 minutes    Assessment  Impairments: abnormal gait, abnormal muscle tone, abnormal or restricted ROM, impaired balance, impaired physical strength, lacks appropriate home exercise program, pain with function, participation limitations, activity limitations and endurance  Functional limitations: unable to engage in leisure activities such as running; stair navigation, transfers    Assessment details: Patient is a 53 yo female presenting to PT with current c/o R hip pain and weakness. She did have surgery back in October due to a fracture and had progressed well but has  been unable to return to full leisure activities such as running. She currently demonstrates impairments including increased right hip pain, decreased RLE flexibility , decreased RLE strength, and gait/balance impairment. With these impairments, patient currently has difficulty with ambulation, transfers, stairs, and cannot engage in leisure activities such as running. Patient also has an antalgic gait pattern and many impairments such as decreased stance time on the RLE and intermittent poor foot clearance on the RLE. Patient will most likely be a good candidate to benefit from skilled OPPT services to address the above deficit areas and maximize functional capacity.   Barriers to therapy: Chronicity of symptoms  Understanding of Dx/Px/POC: good     Prognosis: good    Goals  STG: To be completed in 4 weeks from 5/5/2025  1. Em will be independent with basic HEP to allow patient to complete exercises safely when not directly supervised during PT session.    2. Em will report pain no worse than 5/10 at worst to indicate decreased pain level and improved tolerance to activity.  3. Em will have pain-free ROM to WNL for all R hip planes tested (flexion, IR)  4. Em will have improved R hip strength to 4/5 in all motion to improve quality of ambulation and stair climbing.   5. Em will report pain 50% or better improvement in symptoms from start of POC.   6. Em will be able to ambulate for 100ft with improved gait mechanics as noted by proper heel strike and equal stance time on each leg        LTG: To be completed in 8 weeks from 5/5/2025 or upon discharge from PT.   1. Em will be independent with advanced home exercise program to allow patient to transition from physical therapy care to continue with plan of care at home without supervision from therapist and continue to progress with rehabilitation.  2. Em will report pain no worse than 2/10 at worst to indicate decreased pain level and  improved tolerance to activity.  3. Em will report at least 75% improvement in symptoms compared to start of POC to indicate functional improvement and decrease level of disability.    4. Em will have improved R hip strength to 4+/5 in all motion to improve quality of ambulation and stair climbing.   5. Em will return to jogging on the treadmill at self-selected speed to allow for eventual return to running out pain        Plan  Patient would benefit from: skilled physical therapy and PT eval  Planned modality interventions: biofeedback, neuromuscular electric stimulation, cryotherapy, unattended electrical stimulation and TENS    Planned therapy interventions: IASTM, joint mobilization, kinesiology taping, manual therapy, massage, neuromuscular re-education, patient/caregiver education, strengthening, stretching, therapeutic activities, therapeutic exercise, therapeutic training, IADL retraining, home exercise program, graded activity, graded exercise, gait training, flexibility, behavior modification, balance and activity modification    Frequency: 2x week  Duration in weeks: 8  Plan of Care beginning date: 5/5/2025  Plan of Care expiration date: 6/30/2025  Treatment plan discussed with: patient  Plan details: Patient will be 1-2x/wk depending on her schedule and family matters        Subjective Evaluation    History of Present Illness  Date of surgery: 10/12/2024  Mechanism of injury: surgery  Mechanism of injury: Em is a 54 y.o. female 6 months s/p ORIF RIGHT HIP DHS performed on 10/12/2024 by Dr. Mal Irene. Em reports she was having pain that was initially assumed to be back pain. Pt reports she had an X-ray of the R hip on 10/7/24. Pt was informed that she had R hip bursitis. Pt reports on 10/12 her R leg gave out, pt went for more imaging. Pt was informed from imaging that she had a fracture. Pt reports that she then underwent R hip surgery to fix the fracture. Patient saw Dr. Irene  on  and noted that she is unable to get back to full activities such as running. Dr. Irene ordered OPPT with focus on hip abduction strengthening. She is to return to his office in 10-12 if she feels she needs to.   Quality of life: good    Patient Goals  Patient goals for therapy: increased strength, return to sport/leisure activities, improved balance, increased motion, decreased pain and independence with ADLs/IADLs  Patient goal: get back to running  Pain  Current pain ratin  At best pain ratin  At worst pain ratin  Location: R anterior/lateral hip  Quality: sharp and burning (changes depending on activity)  Relieving factors: rest and ice (Tiger Balm)  Aggravating factors: walking and stair climbing (transfers)  Progression: no change    Social Support  Steps to enter house: yes  2  Stairs in house: yes   13  Lives in: multiple-level home  Lives with: spouse and adult children    Employment status: working  Exercise history: run, bike, walking the dog, go to the gym, swimming      Diagnostic Tests  Abnormal x-ray: just had on  which demonstrated good healing according to ortho note.  Treatments  Previous treatment: physical therapy  Current treatment: physical therapy        Objective     Palpation     Right   Tenderness of the piriformis and TFL.   Trigger point to piriformis.     Tenderness     Right Hip   No tenderness in the greater trochanter.     Active Range of Motion     Right Hip   Flexion: with pain  External rotation (90/90): with pain  Internal rotation (90/90): with pain    Additional Active Range of Motion Details  ROM restriction noted with R hip flexion, hip IR    Strength/Myotome Testing     Left Hip   Planes of Motion   Flexion: 5  External rotation: 4+  Internal rotation: 5    Right Hip   Planes of Motion   Flexion: 4-  Abduction: 4-  External rotation: 4-  Internal rotation: 4-    Tests     Right Hip   Positive SOURAV and FADIR.   SLR: Negative.     Ambulation    Weight-Bearing Status   Weight-Bearing Status (Right): full weight-bearing    Assistive device used: none    Ambulation: Level Surfaces   Ambulation with assistive device: independent    Observational Gait   Gait: antalgic   Decreased right stance time and right step length.   Right foot contact pattern: foot flat  Right foot contact pattern comments: intermittently doesn't clear ground             Precautions: R Hip pain      Manuals 5/5/25            RLE Stretching                                                    Neuro Re-Ed             HEP/Education HEP education and printout; gait mechanics; POC  MF                                                                                          Ther Ex             S/L Mino             S/L Hip ABD             Lunge/Hip Flexor Stretch                                                                              Ther Activity             Sidestepping                          Gait Training             Heel to Toe At mirror x 2 laps with HHA                         Modalities

## 2025-05-05 NOTE — PROGRESS NOTES
PT Evaluation     Today's date: 2025  Patient name: Em Koo  : 1970  MRN: [id number]  Referring provider: Mal Irene MD  Dx:   Encounter Diagnosis     ICD-10-CM    1. Right hip pain  M25.551           Start Time: 1358  Stop Time: 1455  Total time in clinic (min): 57 minutes    Assessment  Impairments: abnormal gait, abnormal muscle tone, abnormal or restricted ROM, impaired balance, impaired physical strength, lacks appropriate home exercise program, pain with function, participation limitations, activity limitations and endurance  Functional limitations: unable to engage in leisure activities such as running; stair navigation, transfers    Assessment details: Patient is a 53 yo female presenting to PT with current c/o R hip pain and weakness. She did have surgery back in October due to a fracture and had progressed well but has been unable to return to full leisure activities such as running. She currently demonstrates impairments including increased right hip pain, decreased RLE flexibility , decreased RLE strength, and gait/balance impairment. With these impairments, patient currently has difficulty with ambulation, transfers, stairs, and cannot engage in leisure activities such as running. Patient also has an antalgic gait pattern and many impairments such as decreased stance time on the RLE and intermittent poor foot clearance on the RLE. Patient will most likely be a good candidate to benefit from skilled OPPT services to address the above deficit areas and maximize functional capacity.   Barriers to therapy: Chronicity of symptoms  Understanding of Dx/Px/POC: good     Prognosis: good    Goals  STG: To be completed in 4 weeks from 2025  1. Em will be independent with basic HEP to allow patient to complete exercises safely when not directly supervised during PT session.    2. Em will report pain no worse than 5/10 at worst to indicate decreased pain level and improved  tolerance to activity.  3. Em will have pain-free ROM to WNL for all R hip planes tested (flexion, IR)  4. Em will have improved R hip strength to 4/5 in all motion to improve quality of ambulation and stair climbing.   5. Em will report pain 50% or better improvement in symptoms from start of POC.   6. Em will be able to ambulate for 100ft with improved gait mechanics as noted by proper heel strike and equal stance time on each leg        LTG: To be completed in 8 weeks from 5/5/2025 or upon discharge from PT.   1. Em will be independent with advanced home exercise program to allow patient to transition from physical therapy care to continue with plan of care at home without supervision from therapist and continue to progress with rehabilitation.  2. Em will report pain no worse than 2/10 at worst to indicate decreased pain level and improved tolerance to activity.  3. Em will report at least 75% improvement in symptoms compared to start of POC to indicate functional improvement and decrease level of disability.    4. Em will have improved R hip strength to 4+/5 in all motion to improve quality of ambulation and stair climbing.   5. Em will return to jogging on the treadmill at self-selected speed to allow for eventual return to running out pain        Plan  Patient would benefit from: skilled physical therapy and PT eval  Planned modality interventions: biofeedback, neuromuscular electric stimulation, cryotherapy, unattended electrical stimulation and TENS    Planned therapy interventions: IASTM, joint mobilization, kinesiology taping, manual therapy, massage, neuromuscular re-education, patient/caregiver education, strengthening, stretching, therapeutic activities, therapeutic exercise, therapeutic training, IADL retraining, home exercise program, graded activity, graded exercise, gait training, flexibility, behavior modification, balance and activity modification    Frequency:  2x week  Duration in weeks: 8  Plan of Care beginning date: 2025  Plan of Care expiration date: 2025  Treatment plan discussed with: patient  Plan details: Patient will be 1-2x/wk depending on her schedule and family matters        Subjective Evaluation    History of Present Illness  Date of surgery: 10/12/2024  Mechanism of injury: surgery  Mechanism of injury: Em is a 54 y.o. female 6 months s/p ORIF RIGHT HIP DHS performed on 10/12/2024 by Dr. Mal Irene. Em reports she was having pain that was initially assumed to be back pain. Pt reports she had an X-ray of the R hip on 10/7/24. Pt was informed that she had R hip bursitis. Pt reports on 10/12 her R leg gave out, pt went for more imaging. Pt was informed from imaging that she had a fracture. Pt reports that she then underwent R hip surgery to fix the fracture. Patient saw Dr. Irene on  and noted that she is unable to get back to full activities such as running. Dr. Irene ordered OPPT with focus on hip abduction strengthening. She is to return to his office in 10-12 if she feels she needs to.   Quality of life: good    Patient Goals  Patient goals for therapy: increased strength, return to sport/leisure activities, improved balance, increased motion, decreased pain and independence with ADLs/IADLs  Patient goal: get back to running  Pain  Current pain ratin  At best pain ratin  At worst pain ratin  Location: R anterior/lateral hip  Quality: sharp and burning (changes depending on activity)  Relieving factors: rest and ice (Tiger Balm)  Aggravating factors: walking and stair climbing (transfers)  Progression: no change    Social Support  Steps to enter house: yes  2  Stairs in house: yes   13  Lives in: multiple-level home  Lives with: spouse and adult children    Employment status: working  Exercise history: run, bike, walking the dog, go to the gym, swimming      Diagnostic Tests  Abnormal x-ray: just had on  which  demonstrated good healing according to ortho note.  Treatments  Previous treatment: physical therapy  Current treatment: physical therapy        Objective     Palpation     Right   Tenderness of the piriformis and TFL.   Trigger point to piriformis.     Tenderness     Right Hip   No tenderness in the greater trochanter.     Active Range of Motion     Right Hip   Flexion: with pain  External rotation (90/90): with pain  Internal rotation (90/90): with pain    Additional Active Range of Motion Details  ROM restriction noted with R hip flexion, hip IR    Strength/Myotome Testing     Left Hip   Planes of Motion   Flexion: 5  External rotation: 4+  Internal rotation: 5    Right Hip   Planes of Motion   Flexion: 4-  Abduction: 4-  External rotation: 4-  Internal rotation: 4-    Tests     Right Hip   Positive SOURAV and FADIR.   SLR: Negative.     Ambulation   Weight-Bearing Status   Weight-Bearing Status (Right): full weight-bearing    Assistive device used: none    Ambulation: Level Surfaces   Ambulation with assistive device: independent    Observational Gait   Gait: antalgic   Decreased right stance time and right step length.   Right foot contact pattern: foot flat  Right foot contact pattern comments: intermittently doesn't clear ground             Precautions: R Hip pain      Manuals 5/5/25            RLE Stretching                                                    Neuro Re-Ed             HEP/Education HEP education and printout; gait mechanics; POC  MF                                                                                          Ther Ex             S/L Clamshells             S/L Hip ABD             Lunge/Hip Flexor Stretch                                                                              Ther Activity             Sidestepping                          Gait Training             Heel to Toe At mirror x 2 laps with HHA                         Modalities

## 2025-05-05 NOTE — HOME EXERCISE EDUCATION
Program_ID:[id number]   Access Code: [email or web address]  URL: [email or web address]  Date: 05-  Prepared By: Beto Mendez    Program Notes      Exercises      - Sidelying Hip Abduction - 1 x daily - 4 x weekly - 3 sets - 10 reps - 2 hold      - Standing Hip Abduction with Counter Support - 1 x daily - 4 x weekly - 3 sets - 10 reps - 2 hold      - Side Stepping with Resistance at Thighs - 1 x daily - 4 x weekly - 3 sets - 5 reps      - Clamshell - 1 x daily - 4 x weekly - 3 sets - 10 reps      - Kneeling Hip Flexor Stretch - 2 x daily - 7 x weekly - 1 sets - 10 reps - 10 hold

## 2025-05-09 ENCOUNTER — OFFICE VISIT (OUTPATIENT)
Age: 55
End: 2025-05-09
Payer: MEDICARE

## 2025-05-09 DIAGNOSIS — M25.551 RIGHT HIP PAIN: Primary | ICD-10-CM

## 2025-05-09 PROCEDURE — 97530 THERAPEUTIC ACTIVITIES: CPT

## 2025-05-09 PROCEDURE — 97112 NEUROMUSCULAR REEDUCATION: CPT

## 2025-05-09 PROCEDURE — 97110 THERAPEUTIC EXERCISES: CPT

## 2025-05-09 PROCEDURE — 97116 GAIT TRAINING THERAPY: CPT

## 2025-05-09 NOTE — PROGRESS NOTES
Daily Note     Today's date: 2025  Patient name: Em Koo  : 1970  MRN: [id number]  Referring provider: Mal Irene MD  Dx:   Encounter Diagnosis     ICD-10-CM    1. Right hip pain  M25.551           Start Time: 1027  Stop Time: 1121  Total time in clinic (min): 54 minutes    Subjective: Patient reports that she remembers that  she was letting the dog outside and stretched across her body to open the door and felt sharp pain in the R anterior hip/groin region. Today, she states that her pain is a 7/10 pain but is decreased with sitting.       Objective: See treatment diary below      Assessment: Em Koo tolerated today's treatment session fairly well.  Patient education provided on modification of exercises. Patient also instructed to use SPC for all gait tasks at this time as she has a noticeable limp without AD but significantly improved mechanics with it. Em completed all TE with good form and no adverse reactions. Patient had increased pain with using recumbent bike with increased hip flexion in the R anterior hip/groin but after seat was moved back to decrease hip flexion, patient did not have pain. Em continues to benefit from skilled OPPT services to address hip pain. Will continue to address functional deficits and focus on progression of POC per patient tolerance.      Patient performed Recumbent bike to increase blood flow to the area being treated, prepare the muscles for strength training and stretching, improve overall tolerance to activity, and aerobic endurance.       Plan: Continue per plan of care.  Progress treatment as tolerated.       Precautions: R Hip pain      Manuals 25           RLE Stretching  MF  HS, Hip ADD           R Hip Inferior Mob  Grade II-III  MF           LAD  RLE   MF                        Neuro Re-Ed             HEP/Education HEP education and printout; gait mechanics; POC  MF HEP education and modification  MF                                                                                          Ther Ex             S/L Clamshells  Trialed supine but pain limiting           S/L Hip ABD  Standing on LLE only 2 x 10           Lunge/Hip Flexor Stretch  15 x :5           Hip Hikes  2 x10 RLE, 1 x 10 on LLE           Prone Quad Stretch   5 x :20 BLE                                                  Ther Activity             Sidestepping  4 laps at rail no band           Bike  8', Seat 12           Gait Training             Heel to Toe At mirror x 2 laps with HHA At mirror x 3 laps with HHA           SPC  3 x laps around clinic           Modalities

## 2025-05-09 NOTE — HOME EXERCISE EDUCATION
Program_ID:[id number]   Access Code: [email or web address]  URL: [email or web address]  Date: 05-  Prepared By: Beto Mendez    Program Notes      Exercises      - Standing Hip Abduction with Counter Support - 1 x daily - 4 x weekly - 3 sets - 10 reps - 2 hold      - Clamshell - 1 x daily - 4 x weekly - 3 sets - 10 reps      - Kneeling Hip Flexor Stretch - 2 x daily - 7 x weekly - 1 sets - 10 reps - 10 hold      - Hip Hiking on Step - 1 x daily - 7 x weekly - 2 sets - 10 reps      - Prone Quadriceps Stretch with Strap - 2 x daily - 7 x weekly - 1 sets - 5 reps - 20 hold

## 2025-05-12 ENCOUNTER — APPOINTMENT (OUTPATIENT)
Age: 55
End: 2025-05-12
Payer: MEDICARE

## 2025-05-13 NOTE — PROGRESS NOTES
5/13/2025  10:51 AM    Patient called PT to notify that she saw the doctor this morning and was told that the hardware in her hip has failed and she was instructed to hold off on physical therapy at this time. She states that she will be going to the orthopedic surgeon's office on Friday to discuss scheduling a JANNET. Patient will be formally discharged for this reason.

## 2025-05-15 ENCOUNTER — APPOINTMENT (OUTPATIENT)
Age: 55
End: 2025-05-15
Payer: MEDICARE

## 2025-05-28 ENCOUNTER — EVALUATION (OUTPATIENT)
Age: 55
End: 2025-05-28
Payer: MEDICARE

## 2025-05-28 DIAGNOSIS — M25.551 PAIN IN RIGHT HIP: Primary | ICD-10-CM

## 2025-05-28 DIAGNOSIS — Z96.641 STATUS POST RIGHT HIP REPLACEMENT: ICD-10-CM

## 2025-05-28 PROCEDURE — 97112 NEUROMUSCULAR REEDUCATION: CPT

## 2025-05-28 PROCEDURE — 97161 PT EVAL LOW COMPLEX 20 MIN: CPT

## 2025-05-28 PROCEDURE — 97116 GAIT TRAINING THERAPY: CPT

## 2025-05-28 NOTE — PROGRESS NOTES
PT Evaluation     Today's date: 2025  Patient name: Em Koo  : 1970  MRN: [id number]  Referring provider: Amber Hopkins CRNP  Dx:   Encounter Diagnosis     ICD-10-CM    1. Pain in right hip  M25.551       2. Status post right hip replacement  Z96.641           Start Time: 1445  Stop Time: 1530  Total time in clinic (min): 45 minutes    Assessment  Impairments: abnormal gait, abnormal or restricted ROM, activity intolerance, impaired balance, impaired physical strength, lacks appropriate home exercise program, pain with function and endurance    Assessment details: Em Koo is a 54 y.o. year old female who presents to outpatient physical therapy s/p R JANNET on . They have right hip range of motion deficits, strength deficits , decreased tolerance to activity, impaired balance , and decreased ability to perform tasks needed for work. They present with the previously stated deficits which limit their ability to participate in ADL's, perform duties needed for work, participate in recreational activities, and perform tasks around the home. Additionally, functional outcome measures such as the LEFS shows functional deficits as noted by a score of 11/80. Em is currently functioning below their prior level of function and is a good rehab candidate who would benefit from skilled outpatient physical therapy services to allow them to reach their goals and return to PLOF, return to work, return to recreational activities, participate more fully in daily activities, and have an improved quality of life.    Physical therapist assistant (PTA) may be utilized to administer treatments as appropriate and in accordance with [facility] Physical Therapy Practice Act.  Understanding of Dx/Px/POC: good     Prognosis: good    Goals  STG: To be completed in 4 weeks from 2025.   1. Em will be independent with basic HEP to allow patient to complete exercises safely when not directly supervised  during PT session.    2. Em will report pain no worse than 4/10 at worst to indicate decreased pain level and improved tolerance to activity.  3. Em will have improved right hip flexion ROM to 75* in order to indicate progression to promoting normalized gait pattern.   4. Em will have improved right hip strength to 4-/5 in all motion to improve quality of ambulation and stair climbing.   5. Em will be able to ambulate at least 200ft in the clinic without assistive device and normalized gait mechanics.  6. Em will improve LEFS by at least 9 points to demonstrate significant functional improvement.         LTG: To be completed in 10 weeks from 5/28/2025  or upon discharge from PT.   1. Em will be independent with advanced home exercise program to allow patient to transition from physical therapy care to continue with plan of care at home without supervision from therapist and continue to progress with rehabilitation.  2. Em will report pain no worse than 2/10 at worst to indicate decreased pain level and improved tolerance to activity.  3. Em will report 75% improvement in symptoms compared to start of POC to indicate functional improvement and decrease level of disability.    4. Em will tolerate single limb stance for 10 seconds on right leg to allow patient to have increased stance time on right leg during ambulation.   5. Em will have improved right hip flexion ROM to 120* and right hip extension ROM to 30* in order to indicate normal hip ROM.  6. Em will have improved right hip strength to 5/5 in all motion to improve quality of ambulation and stair climbing.   7. Maria Teresaewill decreased TUG time by 3 seconds to indicate functional improvement and decreased fall risk.   8. Em will improve LEFS to at least 65/80  9. Em will return to work without impairment as a  at a [employer].         Plan  Patient would benefit from: skilled physical therapy and PT  eval  Planned modality interventions: biofeedback, cryotherapy, electrical stimulation/Russian stimulation, neuromuscular electric stimulation, TENS, unattended electrical stimulation and thermotherapy: hydrocollator packs    Planned therapy interventions: IASTM, joint mobilization, activity modification, kinesiology taping, manual therapy, massage, Greco taping, balance, balance/weight bearing training, nerve gliding, body mechanics training, neuromuscular re-education, patient/caregiver education, strengthening, stretching, therapeutic activities, therapeutic exercise, therapeutic training, home exercise program, graded exercise, graded activity, gait training, functional ROM exercises and flexibility    Frequency: 2x week  Plan of Care beginning date: 2025  Plan of Care expiration date: 2025  Treatment plan discussed with: patient      Subjective Evaluation    History of Present Illness  Date of surgery: 2025  Mechanism of injury: surgery  Mechanism of injury: Em is a 54 yr old female presenting back to OPPT s/p R JANNET, Anterior Approach due to 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 Surgery was completed by Dr. Netwon on 2025. She was discharged home on .    She has been exclusively her walker at home.     She goes back to see the orthopedic on .   Quality of life: good    Patient Goals  Patient goals for therapy: increased strength, independence with ADLs/IADLs, decreased pain, improved balance, return to sport/leisure activities, return to work and increased motion  Patient goal: be able to walk the dog without an assistive device and get back to work  Pain  Current pain ratin  At best pain ratin  At worst pain ratin  Location: R lateral/anterior hip  Quality: burning and knife-like  Relieving factors: ice, medications, change in position and rest  Aggravating factors: stair climbing, walking, standing and sitting    Social  Support  Steps to enter house: yes  2  Stairs in house: yes   13  Lives in: multiple-level home  Lives with: spouse and adult children    Employment status: not working (works at [employer] as a  but currently out of work til healed)  Exercise history: run, bike, walking the dog, go to the gym, swimming    Treatments  Previous treatment: physical therapy      Objective     Observations     Right Hip  Positive for incision. Negative for drainage.     Additional Observation Details  Right lateral hip incision clean and dry with no redness or drainage with stitches in place    Right anterior hip incision clean and dry with no redness or drainage with stitches in place    Active Range of Motion   Left Hip   Flexion: 90 degrees     Right Hip   Flexion: 25 degrees with pain    Additional Active Range of Motion Details  Did not test hip extension due to s/p R anterior JANNET    Passive Range of Motion     Right Hip   Flexion: 75 degrees     Strength/Myotome Testing     Left Hip   Planes of Motion   Flexion: 4+  Abduction: 4+    Right Hip   Planes of Motion   Flexion: 3-  Abduction: 2+    Left Knee   Prone flexion: 5  Extension: 5    Right Knee   Prone flexion: 4  Extension: 4    Ambulation   Weight-Bearing Status   Weight-Bearing Status (Right): weight-bearing as tolerated    Assistive device used: two-wheeled walker    Ambulation: Level Surfaces   Ambulation with assistive device: independent    Observational Gait   Gait: antalgic   Decreased walking speed and stride length.     General Comments:      Hip Comments   LEFS:   TU seconds             Precautions: L JANNET      Manuals              #1                                                   Neuro Re-Ed             Quad Sets HEP            Education MF  HEP and POC                                                                             Ther Ex             Ankle Pumps HEP            Glute Sets HEP            Supine HS Stretch HEP            SAQS HEP             HR/TR HEP            Std Hip ABD HEP                                      Ther Activity                                       Gait Training             SPC  1 lap in clinic                         Modalities

## 2025-05-28 NOTE — LETTER
May 29, 2025    REMBERTO Mendoza  [address]  [place] [address]    Patient: Em Koo   YOB: 1970   Date of Visit: 2025     Encounter Diagnosis     ICD-10-CM    1. Pain in right hip  M25.551       2. Status post right hip replacement  Z96.641           Dear REMBERTO Ernst:    Thank you for your recent referral of Em Koo. Please review the attached evaluation summary from Em's recent visit.     Please verify that you agree with the plan of care by signing the attached order.     If you have any questions or concerns, please do not hesitate to call.     I sincerely appreciate the opportunity to share in the care of one of your patients and hope to have another opportunity to work with you in the near future.       Sincerely,    Beto Mendez, PT      Referring Provider:      I certify that I have read the below Plan of Care and certify the need for these services furnished under this plan of treatment while under my care.                    REMBERTO Mendoza  [address]  [place]   Via Fax: [phone]          PT Evaluation     Today's date: 2025  Patient name: Em Koo  : 1970  MRN: [id number]  Referring provider: Amber Hopkins CRNP  Dx:   Encounter Diagnosis     ICD-10-CM    1. Pain in right hip  M25.551       2. Status post right hip replacement  Z96.641           Start Time: 1445  Stop Time: 1530  Total time in clinic (min): 45 minutes    Assessment  Impairments: abnormal gait, abnormal or restricted ROM, activity intolerance, impaired balance, impaired physical strength, lacks appropriate home exercise program, pain with function and endurance    Assessment details: Em Koo is a 54 y.o. year old female who presents to outpatient physical therapy s/p R JANNET on . They have right hip range of motion deficits, strength deficits , decreased tolerance to activity, impaired balance , and decreased ability to perform tasks needed for  work. They present with the previously stated deficits which limit their ability to participate in ADL's, perform duties needed for work, participate in recreational activities, and perform tasks around the home. Additionally, functional outcome measures such as the LEFS shows functional deficits as noted by a score of 11/80. Em is currently functioning below their prior level of function and is a good rehab candidate who would benefit from skilled outpatient physical therapy services to allow them to reach their goals and return to PLOF, return to work, return to recreational activities, participate more fully in daily activities, and have an improved quality of life.    Physical therapist assistant (PTA) may be utilized to administer treatments as appropriate and in accordance with [facility] Physical Therapy Practice Act.  Understanding of Dx/Px/POC: good     Prognosis: good    Goals  STG: To be completed in 4 weeks from 5/28/2025.   1. Em will be independent with basic HEP to allow patient to complete exercises safely when not directly supervised during PT session.    2. Em will report pain no worse than 4/10 at worst to indicate decreased pain level and improved tolerance to activity.  3. Em will have improved right hip flexion ROM to 75* in order to indicate progression to promoting normalized gait pattern.   4. Em will have improved right hip strength to 4-/5 in all motion to improve quality of ambulation and stair climbing.   5. Em will be able to ambulate at least 200ft in the clinic without assistive device and normalized gait mechanics.  6. Em will improve LEFS by at least 9 points to demonstrate significant functional improvement.         LTG: To be completed in 10 weeks from 5/28/2025  or upon discharge from PT.   1. Em will be independent with advanced home exercise program to allow patient to transition from physical therapy care to continue with plan of care at  home without supervision from therapist and continue to progress with rehabilitation.  2. Em will report pain no worse than 2/10 at worst to indicate decreased pain level and improved tolerance to activity.  3. Em will report 75% improvement in symptoms compared to start of POC to indicate functional improvement and decrease level of disability.    4. Em will tolerate single limb stance for 10 seconds on right leg to allow patient to have increased stance time on right leg during ambulation.   5. Em will have improved right hip flexion ROM to 120* and right hip extension ROM to 30* in order to indicate normal hip ROM.  6. Em will have improved right hip strength to 5/5 in all motion to improve quality of ambulation and stair climbing.   7. Maria Teresaewill decreased TUG time by 3 seconds to indicate functional improvement and decreased fall risk.   8. Em will improve LEFS to at least 65/80  9. Em will return to work without impairment as a  at a [employer].         Plan  Patient would benefit from: skilled physical therapy and PT eval  Planned modality interventions: biofeedback, cryotherapy, electrical stimulation/Russian stimulation, neuromuscular electric stimulation, TENS, unattended electrical stimulation and thermotherapy: hydrocollator packs    Planned therapy interventions: IASTM, joint mobilization, activity modification, kinesiology taping, manual therapy, massage, Greco taping, balance, balance/weight bearing training, nerve gliding, body mechanics training, neuromuscular re-education, patient/caregiver education, strengthening, stretching, therapeutic activities, therapeutic exercise, therapeutic training, home exercise program, graded exercise, graded activity, gait training, functional ROM exercises and flexibility    Frequency: 2x week  Plan of Care beginning date: 5/28/2025  Plan of Care expiration date: 7/23/2025  Treatment plan discussed with:  patient      Subjective Evaluation    History of Present Illness  Date of surgery: 2025  Mechanism of injury: surgery  Mechanism of injury: Em is a 54 yr old female presenting back to OPPT s/p R JANNET, Anterior Approach due to 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 Surgery was completed by Dr. Newton on 2025. She was discharged home on .    She has been exclusively her walker at home.     She goes back to see the orthopedic on .   Quality of life: good    Patient Goals  Patient goals for therapy: increased strength, independence with ADLs/IADLs, decreased pain, improved balance, return to sport/leisure activities, return to work and increased motion  Patient goal: be able to walk the dog without an assistive device and get back to work  Pain  Current pain ratin  At best pain ratin  At worst pain ratin  Location: R lateral/anterior hip  Quality: burning and knife-like  Relieving factors: ice, medications, change in position and rest  Aggravating factors: stair climbing, walking, standing and sitting    Social Support  Steps to enter house: yes  2  Stairs in house: yes   13  Lives in: multiple-level home  Lives with: spouse and adult children    Employment status: not working (works at [employer] as a  but currently out of work til healed)  Exercise history: run, bike, walking the dog, go to the gym, swimming    Treatments  Previous treatment: physical therapy      Objective     Observations     Right Hip  Positive for incision. Negative for drainage.     Additional Observation Details  Right lateral hip incision clean and dry with no redness or drainage with stitches in place    Right anterior hip incision clean and dry with no redness or drainage with stitches in place    Active Range of Motion   Left Hip   Flexion: 90 degrees     Right Hip   Flexion: 25 degrees with pain    Additional Active Range of Motion Details  Did not test hip  extension due to s/p R anterior JANNET    Passive Range of Motion     Right Hip   Flexion: 75 degrees     Strength/Myotome Testing     Left Hip   Planes of Motion   Flexion: 4+  Abduction: 4+    Right Hip   Planes of Motion   Flexion: 3-  Abduction: 2+    Left Knee   Prone flexion: 5  Extension: 5    Right Knee   Prone flexion: 4  Extension: 4    Ambulation   Weight-Bearing Status   Weight-Bearing Status (Right): weight-bearing as tolerated    Assistive device used: two-wheeled walker    Ambulation: Level Surfaces   Ambulation with assistive device: independent    Observational Gait   Gait: antalgic   Decreased walking speed and stride length.     General Comments:      Hip Comments   LEFS:   TU seconds             Precautions: L JANNET      Manuals              #1                                                   Neuro Re-Ed             Quad Sets HEP            Education MF  HEP and POC                                                                             Ther Ex             Ankle Pumps HEP            Glute Sets HEP            Supine HS Stretch HEP            SAQS HEP            HR/TR HEP            Std Hip ABD HEP                                      Ther Activity                                       Gait Training             SPC  1 lap in clinic                         Modalities

## 2025-05-30 ENCOUNTER — OFFICE VISIT (OUTPATIENT)
Age: 55
End: 2025-05-30
Payer: MEDICARE

## 2025-05-30 DIAGNOSIS — M25.551 PAIN IN RIGHT HIP: Primary | ICD-10-CM

## 2025-05-30 DIAGNOSIS — M25.551 RIGHT HIP PAIN: ICD-10-CM

## 2025-05-30 DIAGNOSIS — Z96.641 STATUS POST RIGHT HIP REPLACEMENT: ICD-10-CM

## 2025-05-30 PROCEDURE — 97110 THERAPEUTIC EXERCISES: CPT

## 2025-05-30 PROCEDURE — 97112 NEUROMUSCULAR REEDUCATION: CPT

## 2025-05-30 PROCEDURE — 97530 THERAPEUTIC ACTIVITIES: CPT

## 2025-05-30 NOTE — PROGRESS NOTES
Daily Note     Today's date: 2025  Patient name: Em Koo  : 1970  MRN: [id number]  Referring provider: Mal Irene MD  Dx:   Encounter Diagnosis     ICD-10-CM    1. Pain in right hip  M25.551       2. Status post right hip replacement  Z96.641       3. Right hip pain  M25.551           Start Time: 727  Stop Time: 805  Total time in clinic (min): 38 minutes    Subjective: Patient denies any pain this morning. She reports tightness/soreness in the right hip.       Objective: See treatment diary below      Assessment: Em tolerated today's treatment session well. Em completed all exercises with no adverse reactions. Patient education provided on progression of POC and exercises. Patient required slight verbal cueing for heel strike on the RLE with SPC gait training. Em continues to benefit from skilled OPPT services to address range of motion deficits, BLE strength deficits, gait abnormalities, and balance impairment. PT will continue to address functional deficits and focus on progression of POC per patient tolerance.      Patient performed Nustep to increase blood flow to the area being treated, prepare the muscles for strength training and stretching, improve overall tolerance to activity, and aerobic endurance.       Plan: Progress treament per protocol.      Precautions: R JANNET      Manuals             #1 #2                                                  Neuro Re-Ed             Quad Sets HEP HEP           Education MF  HEP and POC MF  HEP and POC           Tandem on Airex  4 x :15                                                               Ther Ex             Ankle Pumps HEP HEP           Glute Sets HEP HEP           Supine HS Stretch HEP HEP           SAQS HEP HEP           HR/TR HEP 2 x 10           Std Hip ABD HEP 2 x 10                                     Ther Activity             Nustep  Seat 8, Lv 1, 8'           Marching  2 x 10 with 2 HHA           Step Ups  " 4\" 1 x 10 RLE with 2 HHA           Sit to Stands  2 x 10 with bilateral hands           Squats  2 x 10           Gait Training             SPC  1 lap in clinic 2 laps around the clinic with supervision           Cone Step Overs  6\" with reciprocal pattern and SPC x 2 laps           Modalities                                                   "

## 2025-06-02 ENCOUNTER — OFFICE VISIT (OUTPATIENT)
Age: 55
End: 2025-06-02
Payer: MEDICARE

## 2025-06-02 DIAGNOSIS — M25.551 PAIN IN RIGHT HIP: Primary | ICD-10-CM

## 2025-06-02 DIAGNOSIS — Z96.641 STATUS POST RIGHT HIP REPLACEMENT: ICD-10-CM

## 2025-06-02 DIAGNOSIS — M25.551 RIGHT HIP PAIN: ICD-10-CM

## 2025-06-02 PROCEDURE — 97530 THERAPEUTIC ACTIVITIES: CPT

## 2025-06-02 PROCEDURE — 97110 THERAPEUTIC EXERCISES: CPT

## 2025-06-04 ENCOUNTER — OFFICE VISIT (OUTPATIENT)
Age: 55
End: 2025-06-04
Payer: MEDICARE

## 2025-06-04 DIAGNOSIS — Z96.641 STATUS POST RIGHT HIP REPLACEMENT: ICD-10-CM

## 2025-06-04 DIAGNOSIS — M25.551 RIGHT HIP PAIN: ICD-10-CM

## 2025-06-04 DIAGNOSIS — M25.551 PAIN IN RIGHT HIP: Primary | ICD-10-CM

## 2025-06-04 PROCEDURE — 97116 GAIT TRAINING THERAPY: CPT

## 2025-06-04 PROCEDURE — 97530 THERAPEUTIC ACTIVITIES: CPT

## 2025-06-04 PROCEDURE — 97112 NEUROMUSCULAR REEDUCATION: CPT

## 2025-06-04 NOTE — PROGRESS NOTES
Daily Note      Daily Note     Today's date: 2025  Patient name: Em Koo  : 1970  MRN: [id number]  Referring provider: Mal Irene MD  Dx:   Encounter Diagnosis     ICD-10-CM    1. Pain in right hip  M25.551       2. Status post right hip replacement  Z96.641       3. Right hip pain  M25.551           Start Time: 1445  Stop Time: 1525  Total time in clinic (min): 40 minutes    Subjective: Patient reports she has been doing well since last PT visit. She denies any major pain today.       Objective: See treatment diary below      Assessment: Em tolerated today's treatment session well. Em completed all exercises with no adverse reactions. Patient education provided on continued plan of care and progression of POC and exercises. Patient able to negotiate stair case with 2 HHA with a reciprocal pattern. Patient also able to progress program with inclusion of leg press today, both single leg and double leg, without any pain increase. Em continues to benefit from skilled OPPT services to address range of motion deficits, BLE strength deficits, gait abnormalities, and balance impairment. PT will continue to address functional deficits and focus on progression of POC per patient tolerance.      Patient performed Nustep to increase blood flow to the area being treated, prepare the muscles for strength training and stretching, improve overall tolerance to activity, and aerobic endurance.       Plan: Continue per plan of care.  Progress treatment as tolerated.       Precautions: R JANNET      Manuals  6          #1 #2 #3 #4                                                Neuro Re-Ed             Quad Sets HEP HEP HEP          Education MF  HEP and POC MF  HEP and POC MF  HEP and POC MF  HEP and POC         Tandem on Airex  4 x :15 4 x :15          Hip Hikes   RLE 2 x 10 RLE 2 x 10                                                Ther Ex             Ankle Pumps HEP HEP HEP         "  Glute Sets HEP HEP HEP          Supine HS Stretch HEP HEP HEP          SAQS HEP HEP HEP          HR/TR HEP 2 x 10 2 x 10 2 x 10         Std Hip ABD HEP 2 x 10 2 x 10 2 x 10         LAQ   2 x 10 with :03 hold                       Ther Activity             Nustep  Seat 8, Lv 1, 8' Seat 8, Lv 1, 10' Seat 6, Lv 6, 10'         Marching  2 x 10 with 2 HHA 2 x 10 with 2 HHA 2 x 10 with 2 HHA         Step Ups  4\" 1 x 10 RLE with 2 HHA 4\" 1 x 10 RLE with 2 HHA          Sit to Stands  2 x 10 with bilateral hands 2 x 10 with bilateral hands 2 x 10 with bilateral hands         Squats  2 x 10 2 x 10          Sidestepping   3 laps without HHA at handrail 3 x :30 with blaze pods         Leg Press    65# 2 x 10 BLE; 35# 2 x 10 RLE         Gait Training             SPC  1 lap in clinic 2 laps around the clinic with supervision 1 lap with SPC around clinic 1 lap with SPC around clinic         Cone Step Overs  6\" with reciprocal pattern and SPC x 2 laps Held due to anterior pinching pain in the right hip 1 lap then held due to anterior pinching with putting foot down         Stairs    5 laps reciprocally with 2 HHA         Modalities                                                       "

## 2025-06-10 ENCOUNTER — OFFICE VISIT (OUTPATIENT)
Age: 55
End: 2025-06-10
Payer: MEDICARE

## 2025-06-10 DIAGNOSIS — M25.551 RIGHT HIP PAIN: ICD-10-CM

## 2025-06-10 DIAGNOSIS — Z96.641 STATUS POST RIGHT HIP REPLACEMENT: ICD-10-CM

## 2025-06-10 DIAGNOSIS — M25.551 PAIN IN RIGHT HIP: Primary | ICD-10-CM

## 2025-06-10 PROCEDURE — 97116 GAIT TRAINING THERAPY: CPT

## 2025-06-10 PROCEDURE — 97530 THERAPEUTIC ACTIVITIES: CPT

## 2025-06-10 PROCEDURE — 97112 NEUROMUSCULAR REEDUCATION: CPT

## 2025-06-10 NOTE — PROGRESS NOTES
Daily Note     Today's date: 6/10/2025  Patient name: Em Koo  : 1970  MRN: [id number]  Referring provider: Mal Irene MD  Dx:   Encounter Diagnosis     ICD-10-CM    1. Pain in right hip  M25.551       2. Status post right hip replacement  Z96.641       3. Right hip pain  M25.551           Start Time: 09  Stop Time: 1030  Total time in clinic (min): 45 minutes    Subjective: Em reports she is doing well since last PT visit. She admits that she has some tightness in her hip yet.       Objective: See treatment diary below      Assessment: Em tolerated today's treatment session well. Em completed all exercises with no adverse reactions. Patient education provided on continued plan of care and progression of POC and exercises. Patient had increased pain with further hip flexion on the leg press but modified seat position took this away. Progressed gait training to no SPC with slight limp noted but no increase in pain. Em continues to benefit from skilled OPPT services to address range of motion deficits, BLE strength deficits, gait abnormalities, and balance impairment. PT will continue to address functional deficits and focus on progression of POC per patient tolerance.      Patient performed Nustep to increase blood flow to the area being treated, prepare the muscles for strength training and stretching, improve overall tolerance to activity, and aerobic endurance.       Plan: Continue per plan of care.  Progress treatment as tolerated.       Precautions: R JANNET       Manuals  6 6/4 6/10         #1 #2 #3 #4 #5                                               Neuro Re-Ed             Quad Sets HEP HEP HEP          Education MF  HEP and POC MF  HEP and POC MF  HEP and POC MF  HEP and POC MF  HEP and POC        Tandem on Airex  4 x :15 4 x :15  4 x :15        Hip Hikes   RLE 2 x 10 RLE 2 x 10 RLE 2 x 10        Marching Walking     3 laps b/w plinths                          "         Ther Ex             Ankle Pumps HEP HEP HEP  HEP        Glute Sets HEP HEP HEP  HEP        Supine HS Stretch HEP HEP HEP  HEP        SAQS HEP HEP HEP  HEP        HR/TR HEP 2 x 10 2 x 10 2 x 10 2 x 10        Std Hip ABD HEP 2 x 10 2 x 10 2 x 10 2 x 10 b/l        LAQ   2 x 10 with :03 hold                       Ther Activity             Nustep  Seat 8, Lv 1, 8' Seat 8, Lv 1, 10' Seat 6, Lv 6, 10' Seat 6, Lv 7, 10'        Marching  2 x 10 with 2 HHA 2 x 10 with 2 HHA 2 x 10 with 2 HHA         Step Ups  4\" 1 x 10 RLE with 2 HHA 4\" 1 x 10 RLE with 2 HHA  6\" 2 x 10 RLE with 2 HHA        Sit to Stands  2 x 10 with bilateral hands 2 x 10 with bilateral hands 2 x 10 with bilateral hands 2 x 10 without hands on 1 airex pad        Squats  2 x 10 2 x 10  TRX 2 x 15        Sidestepping   3 laps without HHA at handrail 3 x :30 with blaze pods         Leg Press    65# 2 x 10 BLE; 35# 2 x 10 RLE 65# 2 x 10 BLE seat 8; 35# 2 x 10 RLE        Gait Training             SPC  1 lap in clinic 2 laps around the clinic with supervision 1 lap with SPC around clinic 1 lap with SPC around clinic 1 lap in clinic without SPC with SBA        Cone Step Overs  6\" with reciprocal pattern and SPC x 2 laps Held due to anterior pinching pain in the right hip 1 lap then held due to anterior pinching with putting foot down         Stairs    5 laps reciprocally with 2 HHA 5 laps reciprocally with 2 HHA        Modalities                                                         "

## 2025-06-13 ENCOUNTER — OFFICE VISIT (OUTPATIENT)
Age: 55
End: 2025-06-13
Payer: MEDICARE

## 2025-06-13 DIAGNOSIS — Z96.641 STATUS POST RIGHT HIP REPLACEMENT: ICD-10-CM

## 2025-06-13 DIAGNOSIS — M25.551 RIGHT HIP PAIN: Primary | ICD-10-CM

## 2025-06-13 DIAGNOSIS — M25.551 PAIN IN RIGHT HIP: ICD-10-CM

## 2025-06-13 PROCEDURE — 97112 NEUROMUSCULAR REEDUCATION: CPT

## 2025-06-13 PROCEDURE — 97530 THERAPEUTIC ACTIVITIES: CPT

## 2025-06-13 NOTE — PROGRESS NOTES
Daily Note     Today's date: 2025  Patient name: Em Koo  : 1970  MRN: [id number]  Referring provider: Mal Irene MD  Dx:   Encounter Diagnosis     ICD-10-CM    1. Right hip pain  M25.551       2. Status post right hip replacement  Z96.641       3. Pain in right hip  M25.551           Start Time: 948  Stop Time: 1026  Total time in clinic (min): 38 minutes    Subjective: Patient reports that she may have overdid it with household tasks in the last day and is feeling more sore today.       Objective: See treatment diary below      Assessment: Em tolerated today's treatment session well. Em completed all exercises with no adverse reactions. Patient education provided on continued plan of care. Patient able to tolerate gentle hip flexor stretch today with no increase in pain. Patient able to ambulate 1 lap in clinic without use of SPC today. Em continues to benefit from skilled OPPT services to address range of motion deficits, BLE strength deficits, gait abnormalities, and balance impairment. PT will continue to address functional deficits and focus on progression of POC per patient tolerance.      Patient performed Nustep to increase blood flow to the area being treated, prepare the muscles for strength training and stretching, improve overall tolerance to activity, and aerobic endurance.       Plan: Continue per plan of care.  Progress treatment as tolerated.       Precautions: R JANNET       Manuals  6/2 6/4 6/10 6/13        #1 #2 #3 #4 #5 #6                                              Neuro Re-Ed             Quad Sets HEP HEP HEP          Education MF  HEP and POC MF  HEP and POC MF  HEP and POC MF  HEP and POC MF  HEP and POC        Tandem on Airex  4 x :15 4 x :15  4 x :15        Hip Hikes   RLE 2 x 10 RLE 2 x 10 RLE 2 x 10 2 x 10 b/l       Marching Walking     3 laps b/w plinths 3 laps at handrail                                 Ther Ex             Ankle Pumps  "HEP HEP HEP  HEP        Glute Sets HEP HEP HEP  HEP        Supine HS Stretch HEP HEP HEP  HEP        SAQS HEP HEP HEP  HEP        HR/TR HEP 2 x 10 2 x 10 2 x 10 2 x 10        Std Hip ABD HEP 2 x 10 2 x 10 2 x 10 2 x 10 b/l        LAQ   2 x 10 with :03 hold          Gentle Lunge/Hip Flexor Stretch      5 x ;10 on airex       Ther Activity             Nustep  Seat 8, Lv 1, 8' Seat 8, Lv 1, 10' Seat 6, Lv 6, 10' Seat 6, Lv 7, 10' Seat 6, Lv 7, 10'       Marching  2 x 10 with 2 HHA 2 x 10 with 2 HHA 2 x 10 with 2 HHA         Step Ups  4\" 1 x 10 RLE with 2 HHA 4\" 1 x 10 RLE with 2 HHA  6\" 2 x 10 RLE with 2 HHA 6\" 2 x 10 RLE with 2 HHA       Sit to Stands  2 x 10 with bilateral hands 2 x 10 with bilateral hands 2 x 10 with bilateral hands 2 x 10 without hands on 1 airex pad 2 x 10 without hands on 1 airex pad       Squats  2 x 10 2 x 10  TRX 2 x 15 TRX 2 x 15       Sidestepping   3 laps without HHA at handrail 3 x :30 with blaze pods  3 x :30 with blaze pods       Leg Press    65# 2 x 10 BLE; 35# 2 x 10 RLE 65# 2 x 10 BLE seat 8; 35# 2 x 10 RLE 65# 2 x 10 BLE seat 8; 35# 2 x 10 RLE       Gait Training             SPC  1 lap in clinic 2 laps around the clinic with supervision 1 lap with SPC around clinic 1 lap with SPC around clinic 1 lap in clinic without SPC with SBA No cane 1 lap with SBA       Cone Step Overs  6\" with reciprocal pattern and SPC x 2 laps Held due to anterior pinching pain in the right hip 1 lap then held due to anterior pinching with putting foot down         Stairs    5 laps reciprocally with 2 HHA 5 laps reciprocally with 2 HHA        Modalities                                                           "

## 2025-06-16 ENCOUNTER — OFFICE VISIT (OUTPATIENT)
Age: 55
End: 2025-06-16
Payer: MEDICARE

## 2025-06-16 DIAGNOSIS — M25.551 PAIN IN RIGHT HIP: ICD-10-CM

## 2025-06-16 DIAGNOSIS — M25.551 RIGHT HIP PAIN: Primary | ICD-10-CM

## 2025-06-16 DIAGNOSIS — Z96.641 STATUS POST RIGHT HIP REPLACEMENT: ICD-10-CM

## 2025-06-16 PROCEDURE — 97530 THERAPEUTIC ACTIVITIES: CPT

## 2025-06-16 PROCEDURE — 97112 NEUROMUSCULAR REEDUCATION: CPT

## 2025-06-16 NOTE — PROGRESS NOTES
Daily Note     Today's date: 2025  Patient name: Em Koo  : 1970  MRN: [id number]  Referring provider: Mal Irene MD  Dx:   Encounter Diagnosis     ICD-10-CM    1. Right hip pain  M25.551       2. Status post right hip replacement  Z96.641       3. Pain in right hip  M25.551           Start Time: 1113  Stop Time: 1152  Total time in clinic (min): 39 minutes    Subjective: Sonya reports that her pain levels are a 3/10 today but a combination of both pain and stiffness today. She states that the rain doesn't seem to be helping her symptoms. She also notes that she is going back to work tomorrow.       Objective: See treatment diary below      Assessment: Em tolerated today's treatment session well. Em completed all exercises with no adverse reactions. Patient education provided on continued plan of care and progression of POC and exercises. Patient did well with session overall today with no significant increase in pain and ability to progress neuromuscular focused exercises.  Em continues to benefit from skilled OPPT services to address range of motion deficits, BLE strength deficits, gait abnormalities, and balance impairment. PT will continue to address functional deficits and focus on progression of POC per patient tolerance.      Patient performed Nustep to increase blood flow to the area being treated, prepare the muscles for strength training and stretching, improve overall tolerance to activity, and aerobic endurance.       Plan: Continue per plan of care.  Progress treatment as tolerated.       Precautions: R JANNET       Manuals  6/ 6/4 6/10 6/13 6/16       #1 #2 #3 #4 #5 #6 #7                                             Neuro Re-Ed             Quad Sets HEP HEP HEP          Education MF  HEP and POC MF  HEP and POC MF  HEP and POC MF  HEP and POC MF  HEP and POC        Tandem on Airex  4 x :15 4 x :15  4 x :15        Hip Hikes   RLE 2 x 10 RLE 2 x 10 RLE 2 x  "10 2 x 10 b/l 2 x 10 b/l      Marching Walking     3 laps b/w plinths 3 laps at handrail 3 laps at handrail      SLS Blaze Pods      4 x :30                    Ther Ex             Ankle Pumps HEP HEP HEP  HEP        Glute Sets HEP HEP HEP  HEP        Supine HS Stretch HEP HEP HEP  HEP        SAQS HEP HEP HEP  HEP        HR/TR HEP 2 x 10 2 x 10 2 x 10 2 x 10        Std Hip ABD HEP 2 x 10 2 x 10 2 x 10 2 x 10 b/l        LAQ   2 x 10 with :03 hold          Gentle Lunge/Hip Flexor Stretch      5 x ;10 on airex 5 x :10 on airex RLE only      Ther Activity             Nustep  Seat 8, Lv 1, 8' Seat 8, Lv 1, 10' Seat 6, Lv 6, 10' Seat 6, Lv 7, 10' Seat 6, Lv 7, 10' Seat 6, Lv 7, 10'      Marching  2 x 10 with 2 HHA 2 x 10 with 2 HHA 2 x 10 with 2 HHA         Step Ups  4\" 1 x 10 RLE with 2 HHA 4\" 1 x 10 RLE with 2 HHA  6\" 2 x 10 RLE with 2 HHA 6\" 2 x 10 RLE with 2 HHA 6\" 2 x 10 RLE with 2 HHA      Sit to Stands  2 x 10 with bilateral hands 2 x 10 with bilateral hands 2 x 10 with bilateral hands 2 x 10 without hands on 1 airex pad 2 x 10 without hands on 1 airex pad 2 x 10 without hands on 1 airex pad      Squats  2 x 10 2 x 10  TRX 2 x 15 TRX 2 x 15 TRX 3 x 10      Sidestepping   3 laps without HHA at handrail 3 x :30 with blaze pods  3 x :30 with blaze pods 3 laps at mirror with blue TB at ankles      Leg Press    65# 2 x 10 BLE; 35# 2 x 10 RLE 65# 2 x 10 BLE seat 8; 35# 2 x 10 RLE 65# 2 x 10 BLE seat 8; 35# 2 x 10 RLE 75# 2 x 10 BLE seat 8; 45# 2 x 10 RLE      Gait Training             SPC  1 lap in clinic 2 laps around the clinic with supervision 1 lap with SPC around clinic 1 lap with SPC around clinic 1 lap in clinic without SPC with SBA No cane 1 lap with SBA No cane 1 lap      Cone Step Overs  6\" with reciprocal pattern and SPC x 2 laps Held due to anterior pinching pain in the right hip 1 lap then held due to anterior pinching with putting foot down         Stairs    5 laps reciprocally with 2 HHA 5 laps reciprocally " with 2 HHA        Modalities

## 2025-06-23 ENCOUNTER — OFFICE VISIT (OUTPATIENT)
Age: 55
End: 2025-06-23
Payer: MEDICARE

## 2025-06-23 DIAGNOSIS — M25.551 PAIN IN RIGHT HIP: ICD-10-CM

## 2025-06-23 DIAGNOSIS — Z96.641 STATUS POST RIGHT HIP REPLACEMENT: ICD-10-CM

## 2025-06-23 DIAGNOSIS — M25.551 RIGHT HIP PAIN: Primary | ICD-10-CM

## 2025-06-23 PROCEDURE — 97112 NEUROMUSCULAR REEDUCATION: CPT

## 2025-06-23 PROCEDURE — 97110 THERAPEUTIC EXERCISES: CPT

## 2025-06-30 ENCOUNTER — OFFICE VISIT (OUTPATIENT)
Age: 55
End: 2025-06-30
Payer: MEDICARE

## 2025-06-30 DIAGNOSIS — M25.551 PAIN IN RIGHT HIP: ICD-10-CM

## 2025-06-30 DIAGNOSIS — M25.551 RIGHT HIP PAIN: Primary | ICD-10-CM

## 2025-06-30 DIAGNOSIS — Z96.641 STATUS POST RIGHT HIP REPLACEMENT: ICD-10-CM

## 2025-06-30 PROCEDURE — 97530 THERAPEUTIC ACTIVITIES: CPT

## 2025-06-30 PROCEDURE — 97110 THERAPEUTIC EXERCISES: CPT

## 2025-06-30 PROCEDURE — 97112 NEUROMUSCULAR REEDUCATION: CPT

## 2025-06-30 NOTE — PROGRESS NOTES
"Daily Note     Today's date: 2025  Patient name: Em Koo  : 1970  MRN: [id number]  Referring provider: Mal Irene MD  Dx:   Encounter Diagnosis     ICD-10-CM    1. Right hip pain  M25.551       2. Status post right hip replacement  Z96.641       3. Pain in right hip  M25.551           Start Time: 1745  Stop Time: 182  Total time in clinic (min): 42 minutes    Subjective: Patient reports a 3/10 pain in the R groin and lateral hip today. She notes continued pinching pain in the right hip after taking a few steps after sitting for a while.       Objective: See treatment diary below      Assessment: Em tolerated today's treatment session well. Em completed all exercises with no adverse reactions. Patient education provided on continued plan of care. Patient required verbal cueing and demonstrations for proper exercise form. Progressed patient's program today with further challenging neuromuscular exercises. Em continues to benefit from skilled OPPT services to address range of motion deficits, BLE strength deficits, and balance impairment. PT will continue to address functional deficits and focus on progression of POC per patient tolerance.      Patient performed Nustep to increase blood flow to the area being treated, prepare the muscles for strength training and stretching, improve overall tolerance to activity, and aerobic endurance.       Plan: Continue per plan of care.  Progress treatment as tolerated.       Precautions: R JANNET       Manuals  6 6 6/10 6/13 6/16 6/23 6/30     #1 #2 #3 #4 #5 #6 #7 8 #9                                           Neuro Re-Ed             Quad Sets HEP HEP HEP          Education MF  HEP and POC MF  HEP and POC MF  HEP and POC MF  HEP and POC MF  HEP and POC   RG     Tandem on Airex  4 x :15 4 x :15  4 x :15   4x15\" holds 30\"    Hip Hikes   RLE 2 x 10 RLE 2 x 10 RLE 2 x 10 2 x 10 b/l 2 x 10 b/l 2 x 10 b/l 2 x 10 b/l    Marching " "Walking     3 laps b/w plinths 3 laps at handrail 3 laps at handrail 4 laps  3\" holds at handrail HOLD    SLS Blaze Pods      4 x :30  4x30\" b/l 4 x :30 b/l                 Ther Ex             HR/TR HEP 2 x 10 2 x 10 2 x 10 2 x 10        Std Hip ABD HEP 2 x 10 2 x 10 2 x 10 2 x 10 b/l        LAQ   2 x 10 with :03 hold          Hamstring stretch         3x30\" holds R LE only  3x30\" holds R LE only     Gentle Lunge/Hip Flexor Stretch      5 x ;10 on airex 5 x :10 on airex RLE only 3x30\" holds R LE only  3x30\" holds R LE only     Ther Activity             Nustep  Seat 8, Lv 1, 8' Seat 8, Lv 1, 10' Seat 6, Lv 6, 10' Seat 6, Lv 7, 10' Seat 6, Lv 7, 10' Seat 6, Lv 7, 10' Seat 6, Lv 7, 10' Seat 6, Lv 7, 10'    Marching  2 x 10 with 2 HHA 2 x 10 with 2 HHA 2 x 10 with 2 HHA         3 way hip        2# CW x10 ea  2#, 2 x 10 abd/ext only RLE    Step Ups  4\" 1 x 10 RLE with 2 HHA 4\" 1 x 10 RLE with 2 HHA  6\" 2 x 10 RLE with 2 HHA 6\" 2 x 10 RLE with 2 HHA 6\" 2 x 10 RLE with 2 HHA  6\" 2 x 10 RLE with 2 HHA    Sit to Stands  2 x 10 with bilateral hands 2 x 10 with bilateral hands 2 x 10 with bilateral hands 2 x 10 without hands on 1 airex pad 2 x 10 without hands on 1 airex pad 2 x 10 without hands on 1 airex pad X10 with airex and  light resistance black loop band      2 x 10 with 10# DB    Squats  2 x 10 2 x 10  TRX 2 x 15 TRX 2 x 15 TRX 3 x 10  BOSU Squats 2 x 10    Sidestepping   3 laps without HHA at handrail 3 x :30 with blaze pods  3 x :30 with blaze pods 3 laps at mirror with blue TB at ankles 1 lap yellow COB at knees 1 lap yellow COB at knees    Diagonal walking         1 lap yellow COB  at knees HOLD    Leg Press    65# 2 x 10 BLE; 35# 2 x 10 RLE 65# 2 x 10 BLE seat 8; 35# 2 x 10 RLE 65# 2 x 10 BLE seat 8; 35# 2 x 10 RLE 75# 2 x 10 BLE seat 8; 45# 2 x 10 RLE  HOLD    Gait Training             SPC  1 lap in clinic 2 laps around the clinic with supervision 1 lap with SPC around clinic 1 lap with SPC around clinic 1 lap " "in clinic without SPC with SBA No cane 1 lap with SBA No cane 1 lap      Cone Step Overs  6\" with reciprocal pattern and SPC x 2 laps Held due to anterior pinching pain in the right hip 1 lap then held due to anterior pinching with putting foot down    nv     Stairs    5 laps reciprocally with 2 HHA 5 laps reciprocally with 2 HHA        Modalities                                                                 "

## 2025-07-07 ENCOUNTER — OFFICE VISIT (OUTPATIENT)
Age: 55
End: 2025-07-07
Payer: MEDICARE

## 2025-07-07 DIAGNOSIS — Z96.641 STATUS POST RIGHT HIP REPLACEMENT: ICD-10-CM

## 2025-07-07 DIAGNOSIS — M25.551 PAIN IN RIGHT HIP: ICD-10-CM

## 2025-07-07 DIAGNOSIS — M25.551 RIGHT HIP PAIN: Primary | ICD-10-CM

## 2025-07-07 PROCEDURE — 97530 THERAPEUTIC ACTIVITIES: CPT

## 2025-07-07 PROCEDURE — 97112 NEUROMUSCULAR REEDUCATION: CPT

## 2025-07-07 PROCEDURE — 97110 THERAPEUTIC EXERCISES: CPT

## 2025-07-07 NOTE — PROGRESS NOTES
Daily Note     Today's date: 2025  Patient name: Em Koo  : 1970  MRN: [id number]  Referring provider: Mal Irene MD  Dx:   Encounter Diagnosis     ICD-10-CM    1. Right hip pain  M25.551       2. Status post right hip replacement  Z96.641       3. Pain in right hip  M25.551           Start Time: 1120  Stop Time: 1158  Total time in clinic (min): 38 minutes    Subjective: Patient states that the only time she is having pain is when she is sitting for prolonged periods of time and getting up. The pain is described as sharp and brief. She notes that the longer she sits, the worse it is. Otherwise, she notes she is doing very well.      Objective: See treatment diary below      Assessment: Em tolerated today's treatment session well. Em completed all exercises with no adverse reactions. Patient education provided on continued plan of care and progression of POC and exercises. Patient required verbal cueing for proper exercise form. Patient did have some slight hip flexor pain with trialing the bike as a warmup so continued to do Nustep. Patient able to increase from 1 lap to 2 laps for sidestepping with yellow COB today. Em continues to benefit from skilled OPPT services to address BLE strength deficits and balance impairment. PT will continue to address functional deficits and focus on progression of POC per patient tolerance.      Patient performed Nustep to increase blood flow to the area being treated, prepare the muscles for strength training and stretching, improve overall tolerance to activity, and aerobic endurance.       Plan: Continue per plan of care.  Progress treatment as tolerated.       Precautions: R JANNET       Manuals  6 6/ 6/10 6/13 6/16 6/23 6/30 7/7    #1 #2 #3 #4 #5 #6 #7 8 #9 #10                                          Neuro Re-Ed             Quad Sets HEP HEP HEP          Education MF  HEP and POC MF  HEP and POC MF  HEP and POC MF  HEP and POC  "MF  HEP and POC   RG  HEP, POC  MF   Tandem on Airex  4 x :15 4 x :15  4 x :15   4x15\" holds 30\" 4 x :30 holds   Hip Hikes   RLE 2 x 10 RLE 2 x 10 RLE 2 x 10 2 x 10 b/l 2 x 10 b/l 2 x 10 b/l 2 x 10 b/l 2 x 10 b/l   Marching Walking     3 laps b/w plinths 3 laps at handrail 3 laps at handrail 4 laps  3\" holds at handrail HOLD    SLS Blaze Pods      4 x :30  4x30\" b/l 4 x :30 b/l                 Ther Ex             HR/TR HEP 2 x 10 2 x 10 2 x 10 2 x 10        Std Hip ABD HEP 2 x 10 2 x 10 2 x 10 2 x 10 b/l        LAQ   2 x 10 with :03 hold          Hamstring stretch         3x30\" holds R LE only  3x30\" holds R LE only  3x30\" holds R LE only    Gentle Lunge/Hip Flexor Stretch      5 x ;10 on airex 5 x :10 on airex RLE only 3x30\" holds R LE only  3x30\" holds R LE only  3x30\" holds R LE only    Ther Activity             Nustep  Seat 8, Lv 1, 8' Seat 8, Lv 1, 10' Seat 6, Lv 6, 10' Seat 6, Lv 7, 10' Seat 6, Lv 7, 10' Seat 6, Lv 7, 10' Seat 6, Lv 7, 10' Seat 6, Lv 7, 10' Seat 6, Lv 7, 10'   Marching  2 x 10 with 2 HHA 2 x 10 with 2 HHA 2 x 10 with 2 HHA         3 way hip        2# CW x10 ea  2#, 2 x 10 abd/ext only RLE    Step Ups  4\" 1 x 10 RLE with 2 HHA 4\" 1 x 10 RLE with 2 HHA  6\" 2 x 10 RLE with 2 HHA 6\" 2 x 10 RLE with 2 HHA 6\" 2 x 10 RLE with 2 HHA  6\" 2 x 10 RLE with 2 HHA 8\" 3 x 10 RLE with 2 HHA   Sit to Stands  2 x 10 with bilateral hands 2 x 10 with bilateral hands 2 x 10 with bilateral hands 2 x 10 without hands on 1 airex pad 2 x 10 without hands on 1 airex pad 2 x 10 without hands on 1 airex pad X10 with airex and  light resistance black loop band      2 x 10 with 10# DB 2 x 10 with 10# DB   Squats  2 x 10 2 x 10  TRX 2 x 15 TRX 2 x 15 TRX 3 x 10  BOSU Squats 2 x 10 BOSU Squats 2 x 10   Sidestepping   3 laps without HHA at handrail 3 x :30 with blaze pods  3 x :30 with blaze pods 3 laps at mirror with blue TB at ankles 1 lap yellow COB at knees 1 lap yellow COB at knees 1 lap yellow COB at knees   Diagonal " "walking         1 lap yellow COB  at knees HOLD    Leg Press    65# 2 x 10 BLE; 35# 2 x 10 RLE 65# 2 x 10 BLE seat 8; 35# 2 x 10 RLE 65# 2 x 10 BLE seat 8; 35# 2 x 10 RLE 75# 2 x 10 BLE seat 8; 45# 2 x 10 RLE  HOLD    Gait Training             SPC  1 lap in clinic 2 laps around the clinic with supervision 1 lap with SPC around clinic 1 lap with SPC around clinic 1 lap in clinic without SPC with SBA No cane 1 lap with SBA No cane 1 lap      Cone Step Overs  6\" with reciprocal pattern and SPC x 2 laps Held due to anterior pinching pain in the right hip 1 lap then held due to anterior pinching with putting foot down    nv     Stairs    5 laps reciprocally with 2 HHA 5 laps reciprocally with 2 HHA        Modalities                                                                   "

## 2025-07-14 ENCOUNTER — EVALUATION (OUTPATIENT)
Age: 55
End: 2025-07-14
Payer: MEDICARE

## 2025-07-14 DIAGNOSIS — M25.551 RIGHT HIP PAIN: Primary | ICD-10-CM

## 2025-07-14 DIAGNOSIS — M25.551 PAIN IN RIGHT HIP: ICD-10-CM

## 2025-07-14 DIAGNOSIS — Z96.641 STATUS POST RIGHT HIP REPLACEMENT: ICD-10-CM

## 2025-07-14 PROCEDURE — 97112 NEUROMUSCULAR REEDUCATION: CPT

## 2025-07-14 PROCEDURE — 97530 THERAPEUTIC ACTIVITIES: CPT

## 2025-07-14 PROCEDURE — 97164 PT RE-EVAL EST PLAN CARE: CPT

## 2025-07-14 NOTE — HOME EXERCISE EDUCATION
Program_ID:[id number]   Access Code: [email or web address]  URL: [email or web address]  Date: 07-  Prepared By: Beto Mendez    Program Notes      Exercises      - Standing Hip Abduction with Counter Support - 1 x daily - 4 x weekly - 3 sets - 10 reps - 2 hold      - Kneeling Hip Flexor Stretch - 2 x daily - 7 x weekly - 1 sets - 10 reps - 10 hold      - Hip Hiking on Step - 1 x daily - 4 x weekly - 2 sets - 10 reps      - Side Stepping with Resistance at Ankles - 1 x daily - 4 x weekly - 3 sets - 10 reps

## 2025-07-14 NOTE — LETTER
"2025    Mal Irene MD  [address] [place] [address]    Patient: Em Koo   YOB: 1970   Date of Visit: 2025     Encounter Diagnosis     ICD-10-CM    1. Right hip pain  M25.551       2. Status post right hip replacement  Z96.641       3. Pain in right hip  M25.551           Dear Dr. Mal Irene MD:    Thank you for your recent referral of Em Koo. Please review the attached evaluation summary from Em's recent visit.     Please verify that you agree with the plan of care by signing the attached order.     If you have any questions or concerns, please do not hesitate to call.     I sincerely appreciate the opportunity to share in the care of one of your patients and hope to have another opportunity to work with you in the near future.       Sincerely,    Beto Mendez, PT      Referring Provider:      I certify that I have read the below Plan of Care and certify the need for these services furnished under this plan of treatment while under my care.                    Mal Irene MD  [address] [place] [address]  Via Mail          PT Evaluation     Today's date: 2025  Patient name: Em Koo  : 1970  MRN: [id number]  Referring provider: Mal Irene MD  Dx:   Encounter Diagnosis     ICD-10-CM    1. Right hip pain  M25.551       2. Status post right hip replacement  Z96.641       3. Pain in right hip  M25.551             Start Time: 1115  Stop Time: 1200  Total time in clinic (min): 45 minutes    Assessment  Impairments: abnormal or restricted ROM, activity intolerance, impaired physical strength and pain with function    Assessment details: Em has now attended 11 visits since starting PT. She reports a GROC improvement of 60% thus far and notes that the remaining deficits may be \"mental\" or due to fear. She has improved her R hip AROM and strength as evidenced by objective testing " [facility]  Hospitalist Discharge Summary      Date of Admission:  9/28/2024  Date of Discharge:  10/1/2024  Discharging Provider: Wellington Pardo DO  Discharge Service: Hospitalist Service, GOLD TEAM 17    Discharge Diagnoses   Severe hyperglycemia without diabetic ketoacidosis  Diabetes mellitus, subtype uncertain  History of chronic pancreatitis  Pseudohyponatremia secondary to hyperglycemia  Cirrhosis  Right foot, ankle, knee pain  Prolonged QTc  History of spinal fusion    Clinically Significant Risk Factors     # DMII: A1C = 9.5 % (Ref range: <5.7 %) within past 6 months       Follow-ups Needed After Discharge   Follow-up Appointments     Adult [facility] Follow-up and recommended labs and tests      Please follow up with primary care provider at patient's earliest   convenience.  Please pay close attention to glycemic control.  Please follow up with endocrinology as soon as possible.  Recommend gastroenterology follow up.    Appointments on [place] and/or [facility] (with [facility] or [facility]   provider or service). Call [phone] if you haven't heard regarding   these appointments within 7 days of discharge.            Unresulted Labs Ordered in the Past 30 Days of this Admission       Date and Time Order Name Status Description    9/28/2024  4:04 PM Phosphatidylethanol (PEth), Whole Blood In process     9/28/2024  1:56 PM Islet cell antibody IgG In process     9/28/2024  1:56 PM Glutamic acid decarboxylase antibody In process         These results will be followed up by primary care provider.    Discharge Disposition   Discharged to home  Condition at discharge: Stable    Hospital Course   31 year old female who has PMH notable for recently diagnosed liver cirrhosis, h/o alcohol use disorder, h/o chronic pancreatitis admitted on 9/28/2024 for severe hyperglycemia WITHOUT e/o DKA. Etiology of hyperglycemia uncertain, admitted for insulin gtt, further workup,  evaluation by Endocrinology.     Updates   -No acute events overnight  -Hyperglycemia is improving.  Insulin drip was stopped and patient was transitioned to subcutaneous regimen. Endocrinology following the patient.   -Foot pain stable today.      #Severe hyperglycemia, w/o DKA  #Diabetes mellitus, subtype uncertain (T2DM, pancreatogenic)  #H/o chronic pancreatitis  #Pseudohyponatremia 2/2 hyperglycemia (114 corrects to WNL)  Endocrinology following the patient and recommended:    Plan/Recommendations:   - Increase Lantus 15--> 18 units q 24 hrs at 1100  - Adjust Novolog Meal Coverage: 1 unit per 12--> 1 unit per 10 grams CHO, TID AC and 1 unit per 12 grams CHO PRN with snacks/supplements  - Novolog Correction Scale: Medium resistance (ISF: 50) TID AC / HS / 0200   - BG Monitoring: TID AC / HS / 0200  - Hypoglycemia protocol  - Carb counting protocol   - IVAN, Islet cell antibody and c-peptide send 9/28- pending      #Cirrhosis  Diagnosed in June 2024 after presenting to OSH w/ decompensated cirrhosis. Attributed to alcohol use, though given patient's young age, wonder if there is additional contributor leading to liver dysfunction, especially given pancreatic dysfunction as above.   No hx of liver disease in family, but there is an autoimmune hx of lupus, DM. No known family members with hemochromatosis w/ iron overload. Negative hepatitis panel. She did have some rheumatologic/autoimmune labs checked in June/July 2024 (cold agglutinin titer, SCL 70 IgG isael, MIKEY, autoimmune liver disease panel, RF, CCP isael) all unremarkable. She had an elevated alpha 1 antitrysin of 217 (ULN= 200) and low ceruloplasmin of 19.2 (LLN 20). 24H copper urine was ordered given low ceruloplasmin by [facility] provider in July 2024, does not appear this has been done. Pt has also not been tested for hemochromatosis. Ferritin checked 7/31 elevated to 254, but WNL at 41 on 9/16 (pt had been started on ferrous gluconate). Other iron studies  today. Additionally, she has improved the TUG from 18 seconds to 7 seconds and LEFS from 11/80 to 55/80 demonstrating significant functional progress. Patient is still showing signs of a hip flexor tendinopathy on her surgical side but is very diligent with her HEP. She would like to progress to a formal HEP today and stop attending PT. Discussed with patient updated HEP and activity modification with possible hip flexor tendinopathy.   Understanding of Dx/Px/POC: good     Prognosis: good    Goals  STG: To be completed in 4 weeks from 5/28/2025.   1. Em will be independent with basic HEP to allow patient to complete exercises safely when not directly supervised during PT session.  (Met)  2. Em will report pain no worse than 4/10 at worst to indicate decreased pain level and improved tolerance to activity. (6/10)  3. Em will have improved right hip flexion ROM to 75* in order to indicate progression to promoting normalized gait pattern.  (Met)  4. Em will have improved right hip strength to 4-/5 in all motion to improve quality of ambulation and stair climbing. (Met)  5. Em will be able to ambulate at least 200ft in the clinic without assistive device and normalized gait mechanics. (Met)  6. Em will improve LEFS by at least 9 points to demonstrate significant functional improvement.  (Met)        LTG: To be completed in 10 weeks from 5/28/2025  or upon discharge from PT.   1. Em will be independent with advanced home exercise program to allow patient to transition from physical therapy care to continue with plan of care at home without supervision from therapist and continue to progress with rehabilitation. (Met)  2. Em will report pain no worse than 2/10 at worst to indicate decreased pain level and improved tolerance to activity. (6/10)  3. Em will report 75% improvement in symptoms compared to start of POC to indicate functional improvement and decrease level of disability.   (60%)  4. Em will tolerate single limb stance for 10 seconds on right leg to allow patient to have increased stance time on right leg during ambulation. (Met)  5. Em will have improved right hip flexion ROM to 120* and right hip extension ROM to 30* in order to indicate normal hip ROM. (105)  6. Em will have improved right hip strength to 5/5 in all motion to improve quality of ambulation and stair climbing. (4+/5)  7. Maria Teresaewill decreased TUG time by 3 seconds to indicate functional improvement and decreased fall risk. (Met)  8. Em will improve LEFS to at least 65/80 (55/80)  9. Em will return to work without impairment as a  at a [employer]. (Met)        Plan  Patient would benefit from: skilled physical therapy and PT eval  Planned modality interventions: biofeedback, cryotherapy, electrical stimulation/Russian stimulation, neuromuscular electric stimulation, TENS, unattended electrical stimulation and thermotherapy: hydrocollator packs    Planned therapy interventions: IASTM, joint mobilization, activity modification, kinesiology taping, manual therapy, massage, Greco taping, balance, balance/weight bearing training, nerve gliding, body mechanics training, neuromuscular re-education, patient/caregiver education, strengthening, stretching, therapeutic activities, therapeutic exercise, therapeutic training, home exercise program, graded exercise, graded activity, gait training, functional ROM exercises and flexibility    Frequency: 1x week  Duration in weeks: 1  Plan of Care beginning date: 7/14/2025  Plan of Care expiration date: 7/21/2025  Treatment plan discussed with: patient    Subjective Evaluation    History of Present Illness  Date of surgery: 5/20/2025  Mechanism of injury: surgery  Mechanism of injury: Em is a 54 yr old female presenting back to OPPT s/p R JANNET, Anterior Approach due to 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  from 9/16 (compared to 7/31) w/ iron 25/ low (60/ wnl), TIBC 302/ wnl (141/ low), TSAT 8/ low (43/ wnl). High folate and B12. This admission, INR 1.19 (stable compared to 9/16) but normal AST/ALT, alk phos, and bili. HILDA not detectable. Pt reporting last drink in June 2024, denies other substance use.      - PeTH, UDS  - Restart PTA lasix and spironolactone   - Daily MELD labs  - Social work consult to ensure pt is set up w/ resources to ensure support for sobriety, mental health, etc as we want to set her up for success in the future if/when she gets a transplant     #R foot, ankle, knee pain   Etiology unclear. Has been bothersome since June 2024, does report someone stepping on her foot but not sure if this is related. Has had multiple imaging studies (XR, CT, MRI) w/o remarkable abnormalities to explain pain. PCP questioned if pt has CRPS-- ordered triple phase bone scan to eval, does not appear to have been completed. Uric acid checked 7/31 was elevated to 11, was WNL in June 2024. Exam not c/w gout (no significant erythema, minimal swelling, able to tolerate palpation), though this is a possibility to consider if colchicine or allopurinol have not been trialed. She does have a hx of lumbar fusion and noted possible foot drop in Juley 2024-- MRI lumbar spine w/  mild to mod R neural foraminal stenossi @ L4-5. On exam, no objective weakness noted.   Requesting something stronger than ketorolac for pain. Given unremarkable objective workup, do not have strong indication to provide opioids for pain, especially given it is not acutely changed. CRPS is certainly a reasonable differential, though w/o further information/data, difficult to justify opioids for treatment. I do find her focus on her foot pain in light of her severe liver disease and very high BG/potential for pancreatic dysfunction to be somewhat strange and unexpected. No overt signs to suggest drug seeking behavior, but her behavior is somewhat odd  Surgery was completed by Dr. Newton on 2025. She was discharged home on .       RE: Patient reports a GROC improvement of 60% thus far and trending up.She states that part of it is mental and wants to wait about 6 months until she starts running again. She notes that putting on certain socks are difficult to get on. She notes continued groin pain when getting up after sitting usually but has noticed a good improvement in the last week and no longer has to wait until taking the first step.   Quality of life: good    Patient Goals  Patient goals for therapy: increased strength, independence with ADLs/IADLs, decreased pain, improved balance, return to sport/leisure activities, return to work and increased motion  Patient goal: be able to walk the dog without an assistive device and get back to work (met)  Pain  Current pain ratin  At best pain ratin  At worst pain ratin (after walking 2 miles and then having to sit and drive for a while)  Location: R lateral/anterior hip  Quality: burning and sharp  Relieving factors: ice, medications, change in position and rest  Aggravating factors: stair climbing, walking, standing and sitting    Social Support  Steps to enter house: yes  2  Stairs in house: yes   13  Lives in: multiple-level home  Lives with: spouse and adult children    Employment status: not working (works at [employer] as a  but currently out of work til healed)  Exercise history: run, bike, walking the dog, go to the gym, swimming    Treatments  Previous treatment: physical therapy    Objective     Active Range of Motion   Left Hip   Flexion: 110 degrees     Right Hip   Flexion: 105 degrees with pain    Strength/Myotome Testing     Left Hip   Planes of Motion   Flexion: 5  Abduction: 5    Right Hip   Planes of Motion   Flexion: 4+  Abduction: 4    Left Knee   Prone flexion: 5  Extension: 5    Right Knee   Prone flexion: 5  Extension: 5    Ambulation   Weight-Bearing Status  nonetheless; want to be judicious in evaluation of pain and use of opioids given she does have a hx of AUD, suggesting potential tendency for addictive behavior. If she is being worked up for a transplant, would like to prevent any complications that would prevent candidacy/success.   - Continue PTA duloxetine  - Continue PTA gabapentin 300mg TID  - Continue PTA tizanidine PRN  - Consider colchicine vs allopurinol if c/f gout  - Consider high dose Vitamin C if high suspicion for CRPS  - Complaining of severe pain. Short course of PO Dilaudid for now.   :: Obtain triple phase bone scan as recommended by PCP in OP setting     #Prolonged QTc (511 on 9/28): Caution w/ QTc prolonging medications  #H/o spinal fusion: Reporting some R sided foot drop, no objective weakness on exam (though did not walk patient). MRI lumbar spine within last few months did show mild to mod spinal stenosis.    Consultations This Hospital Stay   NURSING TO CONSULT FOR VASCULAR ACCESS CARE IP CONSULT  ENDOCRINE DIABETES ADULT IP CONSULT  PHARMACY LIAISON FOR MEDICATION COVERAGE CONSULT  CARE MANAGEMENT / SOCIAL WORK IP CONSULT  PHARMACY IP CONSULT  DIABETES EDUCATION IP CONSULT  NUTRITION SERVICES ADULT IP CONSULT  PHARMACY LIAISON FOR MEDICATION COVERAGE CONSULT    Code Status   Full Code    Time Spent on this Encounter   I, Wellington Pardo DO, personally saw the patient today and spent greater than 30 minutes discharging this patient.       Wellington Pardo DO, S  [facility] MED SURG  [facility] [address]  Phone: [phone]  Fax: [phone]  ______________________________________________________________________       Primary Care Physician   Domingo Peñaloza    Discharge Orders      Primary Care - Care Coordination Referral      Reason for your hospital stay    Patient was admitted to the hospital with hyperglycemia.     Activity    Your activity upon discharge: activity as tolerated     Adult  "  Weight-Bearing Status (Right): full weight-bearing    Assistive device used: none    Ambulation: Level Surfaces   Ambulation without assistive device: independent    Observational Gait   Gait: within functional limits   Walking speed and stride length within functional limits.     General Comments:      Hip Comments   LEFS: 11/80 at IE; LEFS: 55/80 at RE on   TU seconds at IE; TU seconds at RE on              Precautions: L JANNET      Manuals  6/2 6/4 6/10 6/13 6/16 6/23 6/30 7/7 7/14    #1 #2 #3 #4 #5 #6 #7 8 #9 #10 #11   Re-Evaluation           MF                               Neuro Re-Ed              Quad Sets HEP HEP HEP           Education MF  HEP and POC MF  HEP and POC MF  HEP and POC MF  HEP and POC MF  HEP and POC   RG  HEP, POC  MF HEP  MF   Tandem on Airex  4 x :15 4 x :15  4 x :15   4x15\" holds 30\" 4 x :30 holds    Hip Hikes   RLE 2 x 10 RLE 2 x 10 RLE 2 x 10 2 x 10 b/l 2 x 10 b/l 2 x 10 b/l 2 x 10 b/l 2 x 10 b/l    Marching Walking     3 laps b/w plinths 3 laps at handrail 3 laps at handrail 4 laps  3\" holds at handrail HOLD     SLS Blaze Pods      4 x :30  4x30\" b/l 4 x :30 b/l                   Ther Ex              HR/TR HEP 2 x 10 2 x 10 2 x 10 2 x 10         Std Hip ABD HEP 2 x 10 2 x 10 2 x 10 2 x 10 b/l         LAQ   2 x 10 with :03 hold           Hamstring stretch         3x30\" holds R LE only  3x30\" holds R LE only  3x30\" holds R LE only     Gentle Lunge/Hip Flexor Stretch      5 x ;10 on airex 5 x :10 on airex RLE only 3x30\" holds R LE only  3x30\" holds R LE only  3x30\" holds R LE only     Ther Activity              Nustep  Seat 8, Lv 1, 8' Seat 8, Lv 1, 10' Seat 6, Lv 6, 10' Seat 6, Lv 7, 10' Seat 6, Lv 7, 10' Seat 6, Lv 7, 10' Seat 6, Lv 7, 10' Seat 6, Lv 7, 10' Seat 6, Lv 7, 10' Seat 6, Lv 7, 10'   Marching  2 x 10 with 2 HHA 2 x 10 with 2 HHA 2 x 10 with 2 HHA          3 way hip        2# CW x10 ea  2#, 2 x 10 abd/ext only RLE     Step Ups  4\" 1 x 10 RLE with 2 HHA " [facility] Follow-up and recommended labs and tests    Please follow up with primary care provider at patient's earliest convenience.  Please pay close attention to glycemic control.  Please follow up with endocrinology as soon as possible.  Recommend gastroenterology follow up.    Appointments on [place] and/or [facility] (with [facility] or [facility] provider or service). Call [phone] if you haven't heard regarding these appointments within 7 days of discharge.     Diet    Follow this diet upon discharge: Consistent carbohydrate diet       Significant Results and Procedures   Results for orders placed or performed during the hospital encounter of 09/06/24   XR Chest 2 Views    Narrative    Exam: XR CHEST 2 VIEWS, 9/6/2024 10:00 AM    Comparison: Radiograph 8/15/2024, 8/14/2024, 8/13/2024    History: chest pain    Findings:  PA and lateral views of the chest. Trachea is midline.  Cardiomediastinal silhouette is partially obscured along the right  heart border. Elevation of the right hemidiaphragm. Significant  decreased multifocal consolidative opacities throughout both lungs  compared to 8/15/2024. No appreciable pleural effusion. No  pneumothorax. No acute osseous abnormality.      Impression    Impression:   1. Significant decreased multifocal consolidative opacities throughout  both lungs compared to 8/15/2024.  2. No appreciable residual pleural effusion.  3. Elevation of the right hemidiaphragm.     I have personally reviewed the examination and initial interpretation  and I agree with the findings.    NADJA AUSTIN MD         SYSTEM ID:  [id number]   US Lower Extremity Venous Duplex Bilateral    Narrative    EXAMINATION: DOPPLER VENOUS ULTRASOUND OF BILATERAL LOWER EXTREMITIES,  9/6/2024 10:26 AM     COMPARISON: 7/31/2024.    HISTORY: Bilateral lower extremity swelling and pain.    TECHNIQUE: Gray-scale evaluation with compression, spectral flow and  color Doppler assessment of the deep venous system of both  "4\" 1 x 10 RLE with 2 HHA  6\" 2 x 10 RLE with 2 HHA 6\" 2 x 10 RLE with 2 HHA 6\" 2 x 10 RLE with 2 HHA  6\" 2 x 10 RLE with 2 HHA 8\" 3 x 10 RLE with 2 HHA    Sit to Stands  2 x 10 with bilateral hands 2 x 10 with bilateral hands 2 x 10 with bilateral hands 2 x 10 without hands on 1 airex pad 2 x 10 without hands on 1 airex pad 2 x 10 without hands on 1 airex pad X10 with airex and  light resistance black loop band      2 x 10 with 10# DB 2 x 10 with 10# DB    Squats  2 x 10 2 x 10  TRX 2 x 15 TRX 2 x 15 TRX 3 x 10  BOSU Squats 2 x 10 BOSU Squats 2 x 10    Sidestepping   3 laps without HHA at handrail 3 x :30 with blaze pods  3 x :30 with blaze pods 3 laps at mirror with blue TB at ankles 1 lap yellow COB at knees 1 lap yellow COB at knees 1 lap yellow COB at knees    Diagonal walking         1 lap yellow COB  at knees HOLD     Leg Press    65# 2 x 10 BLE; 35# 2 x 10 RLE 65# 2 x 10 BLE seat 8; 35# 2 x 10 RLE 65# 2 x 10 BLE seat 8; 35# 2 x 10 RLE 75# 2 x 10 BLE seat 8; 45# 2 x 10 RLE  HOLD     Gait Training              SPC  1 lap in clinic 2 laps around the clinic with supervision 1 lap with SPC around clinic 1 lap with SPC around clinic 1 lap in clinic without SPC with SBA No cane 1 lap with SBA No cane 1 lap       Cone Step Overs  6\" with reciprocal pattern and SPC x 2 laps Held due to anterior pinching pain in the right hip 1 lap then held due to anterior pinching with putting foot down    nv      Stairs    5 laps reciprocally with 2 HHA 5 laps reciprocally with 2 HHA         Modalities                                                                 " legs from  groin to knee, and then at the ankles.    FINDINGS:  In both lower extremities, the common femoral, femoral, and popliteal  veins demonstrate normal compressibility and blood flow. Normal  compressibility of the posterior tibial and peroneal veins.    Mildly enlarged benign-appearing lymph node with a fatty hilum in the  right upper thigh measuring 2.5 x 1.0 x 2.1 cm, likely reactive.      Impression    IMPRESSION: No evidence of deep venous thrombosis in either lower  extremity.    I have personally reviewed the examination and initial interpretation  and I agree with the findings.    JEISON WILSON MD         SYSTEM ID:  [id number]   CT Chest Pulmonary Embolism w Contrast    Narrative    EXAMINATION: CTA pulmonary angiogram, 9/6/2024 11:53 AM     COMPARISON: CT chest 8/12/2024.    HISTORY: Short of breath, elevated d-dimer    TECHNIQUE: Volumetric helical acquisition of CT images of the chest  from the lung apices to the kidneys were acquired after the  administration of 80 mL of Isovue-370 IV contrast. Post-processed  multiplanar and/or MIP reformations were obtained, archived to PACS  and used in interpretation of this study.     FINDINGS:      Contrast bolus is: excellent.  Exam is negative for acute pulmonary  embolism. No CT evidence of right heart strain.    Lungs: Central airways are patent. Large right pleural effusion and  small left pleural effusion, increased on the right and decrease in  the left. Homogeneously enhancing segmental atelectasis of the medial  right upper lobe, right middle lobe, and posterior right lower lobe  with resolution of the previously seen multifocal bilateral  groundglass and consolidative opacities. Subsegmental lingular and  left lower lobe atelectasis.    Mediastinum: Heart size normal. No pericardial effusion. Normal  caliber of the descending thoracic aorta and main pulmonary artery. No  mediastinal lymphadenopathy. Normal esophagus. Normal thyroid.    Upper  abdomen: Ascites in the upper abdomen, partially visualized.  Coarse calcifications throughout the atrophic pancreas.    Bones/Soft Tissues: No acute osseous abnormalities or suspicious bony  lesions. Soft tissues are unremarkable.      Impression    IMPRESSION:   1. No pulmonary embolism.  2. Increased large right pleural effusion and decreased small left  pleural effusion with adjacent compressive atelectasis, as detailed  above. Resolution of the previously seen multifocal groundglass and  consolidative opacities.  3. Partially visualized ascites in the upper abdomen.  4. Changes of chronic pancreatitis.      I have personally reviewed the examination and initial interpretation  and I agree with the findings.    BRIE VOGEL DO         SYSTEM ID:  [id number]   POC US Guide for Thorcentesis    Impression    Limited chest ultrasound was performed and demonstrated an adequate fluid collection on the right hemithorax.     Thoracentesis Indication:pleural effusion    Doppler of the skin demonstrated an area at this site without significant vasculature.  A thoracentesis at this site was subsequently performed.    Jovon Werner MD     XR Chest 2 Views    Narrative    Exam: XR CHEST 2 VIEWS, 9/6/2024 4:24 PM    Comparison: CT and radiograph same day    History: Post thoracentesis screen for pneumothorax    Findings:  PA and lateral views of the chest. Low lung volumes. No significant  right pleural effusion remaining. Trace left pleural effusion. No  pneumothorax. Minimal streaky perihilar opacities, likely atelectasis.  No acute osseous abnormality. Stable cardiomediastinal silhouette.      Impression    Impression:   1. No significant remaining right pleural effusion. No appreciable  pneumothorax.  2. Trace left pleural effusion.  3. Minimal streaky perihilar opacities, likely atelectasis.     I have personally reviewed the examination and initial interpretation  and I agree with the findings.    ANGI CARD  DO         SYSTEM ID:  [id number]       Discharge Medications   Current Discharge Medication List        START taking these medications    Details   Alcohol Swabs PADS Use to swab the area of the injection or hi as directed Per insurance coverage  Qty: 200 each, Refills: 1    Associated Diagnoses: Hyperglycemia      blood glucose (NO BRAND SPECIFIED) lancets standard To use to test glucose level in the blood Use to test blood sugar 4 times daily as directed. To accompany glucose monitor brands per insurance coverage.  Qty: 200 each, Refills: 1    Associated Diagnoses: Hyperglycemia      blood glucose (NO BRAND SPECIFIED) test strip To use to test glucose level in the blood Use to test blood sugar 4 times daily as directed. To accompany glucose monitor brands per insurance coverage.  Qty: 100 strip, Refills: 0    Associated Diagnoses: Hyperglycemia      blood glucose monitoring (NO BRAND SPECIFIED) meter device kit Use as directed Per insurance coverage  Qty: 1 kit, Refills: 0    Associated Diagnoses: Hyperglycemia      glucose 40 % (400 mg/mL) gel Take 15-30 g by mouth every 15 minutes as needed for low blood sugar.  Qty: 30 g, Refills: 0    Associated Diagnoses: Other specified diabetes mellitus with other specified complication, unspecified whether long term insulin use (H)      !! insulin aspart (NOVOLOG PEN) 100 UNIT/ML pen Novolog 8 unit(s) for bigger meals (60 g cho) and 4 unit(s) for smaller snacks (25-30 g cho) with meals plus correction scale  Qty: 15 mL, Refills: 0    Associated Diagnoses: Other specified diabetes mellitus with other specified complication, unspecified whether long term insulin use (H)      !! insulin aspart (NOVOLOG PEN) 100 UNIT/ML pen Novolog correction scale three times a day before meals and at bedtime. Do not take more frequently than every 4 hours.  Qty: 15 mL, Refills: 0    Associated Diagnoses: Other specified diabetes mellitus with other specified complication, unspecified  whether long term insulin use (H)      !! insulin aspart (NOVOLOG PEN) 100 UNIT/ML pen 1 unit per 10 with breakfast reduce-> 1 unit per 12 grams CHO, Lunch and dinner and 1 unit per 12 grams CHO PRN with snacks/supplements  Qty: 15 mL, Refills: 0    Associated Diagnoses: Other specified diabetes mellitus with other specified complication, unspecified whether long term insulin use (H)      !! insulin aspart (NOVOLOG PEN) 100 UNIT/ML pen 1 unit per 10 with breakfast reduce-> 1 unit per 12 grams CHO, Lunch and dinner and 1 unit per 12 grams CHO PRN with snacks/supplements  Qty: 15 mL, Refills: 0    Associated Diagnoses: Other specified diabetes mellitus with other specified complication, unspecified whether long term insulin use (H)      !! insulin aspart (NOVOLOG PEN) 100 UNIT/ML pen 1 unit per 10 with breakfast reduce-> 1 unit per 12 grams CHO, Lunch and dinner and 1 unit per 12 grams CHO PRN with snacks/supplements  Qty: 15 mL, Refills: 0    Associated Diagnoses: Other specified diabetes mellitus with other specified complication, unspecified whether long term insulin use (H)      !! insulin aspart (NOVOLOG PEN) 100 UNIT/ML pen Novolog correction scale three times a day before meals and at bedtime. Do not take more frequently than every 4 hours.  Qty: 15 mL, Refills: 0    Associated Diagnoses: Other specified diabetes mellitus with other specified complication, unspecified whether long term insulin use (H)      insulin glargine (LANTUS PEN) 100 UNIT/ML pen Inject 20 Units subcutaneously every 24 hours.  Qty: 6 mL, Refills: 0    Comments: If Lantus is not covered by insurance, may substitute Basaglar or Semglee or other insulin glargine product per insurance preference at same dose and frequency.    Associated Diagnoses: Other specified diabetes mellitus with other specified complication, unspecified whether long term insulin use (H)      insulin pen needle (32G X 4 MM) 32G X 4 MM miscellaneous Use as directed by  provider Per insurance coverage  Qty: 200 each, Refills: 1    Associated Diagnoses: Hyperglycemia      Sharps Container MISC Use as directed to dispose of needles, lancets and other sharps  Qty: 1 each, Refills: 1    Associated Diagnoses: Hyperglycemia       !! - Potential duplicate medications found. Please discuss with provider.        CONTINUE these medications which have NOT CHANGED    Details   acetaminophen (TYLENOL) 325 MG tablet Take 2 tablets (650 mg) by mouth 3 times daily.  Qty: 90 tablet, Refills: 0    Associated Diagnoses: Mononeuropathy due to underlying disease      cyanocobalamin (VITAMIN B-12) 1000 MCG tablet Take 1 tablet (1,000 mcg) by mouth daily.  Qty: 100 tablet, Refills: 3    Associated Diagnoses: Mononeuropathy due to underlying disease      DULoxetine (CYMBALTA) 30 MG capsule Take 1 capsule (30 mg) by mouth daily.  Qty: 90 capsule, Refills: 2    Associated Diagnoses: Mononeuropathy due to underlying disease; Anxiety      ferrous sulfate (FEROSUL) 325 (65 Fe) MG tablet Take 1 tablet (325 mg) by mouth daily (with breakfast).  Qty: 90 tablet, Refills: 3    Associated Diagnoses: Iron deficiency      folic acid (FOLVITE) 1 MG tablet Take 1 tablet (1 mg) by mouth daily.  Qty: 100 tablet, Refills: 3    Associated Diagnoses: Anemia, unspecified type      furosemide (LASIX) 20 MG tablet Take 1 tablet (20 mg) by mouth daily.  Qty: 90 tablet, Refills: 3    Associated Diagnoses: Alcoholic cirrhosis of liver with ascites (H)      hydrOXYzine HCl (ATARAX) 25 MG tablet Take 1 tablet (25 mg) by mouth every 6 hours as needed for other (adjuvant pain).  Qty: 180 tablet, Refills: 3    Associated Diagnoses: Mononeuropathy due to underlying disease; Anxiety      magnesium 250 MG tablet Take 1 tablet by mouth daily. OTC      senna-docusate (SENOKOT-S/PERICOLACE) 8.6-50 MG tablet Take 1 tablet by mouth 2 times daily as needed for constipation.  Qty: 15 tablet, Refills: 0    Associated Diagnoses: Mononeuropathy due  to underlying disease      spironolactone (ALDACTONE) 50 MG tablet Take 1 tablet (50 mg) by mouth daily.  Qty: 90 tablet, Refills: 3    Associated Diagnoses: Alcoholic cirrhosis of liver with ascites (H)      thiamine (B-1) 100 MG tablet Take 1 tablet (100 mg) by mouth daily.  Qty: 100 tablet, Refills: 11    Associated Diagnoses: Alcoholic cirrhosis of liver with ascites (H)      diclofenac (VOLTAREN) 1 % topical gel Apply 4 g topically 4 times daily.  Qty: 100 g, Refills: 0    Associated Diagnoses: Mononeuropathy due to underlying disease      gabapentin (NEURONTIN) 300 MG capsule Take 1 capsule (300 mg) by mouth 3 times daily.  Qty: 180 capsule, Refills: 4    Associated Diagnoses: Mononeuropathy due to underlying disease      multivitamin w/minerals (THERA-VIT-M) tablet Take 1 tablet by mouth daily.  Qty: 100 tablet, Refills: 3    Associated Diagnoses: Alcoholic cirrhosis of liver with ascites (H)      naloxone (NARCAN) 4 MG/0.1ML nasal spray Spray 1 spray (4 mg) into one nostril alternating nostrils as needed for opioid reversal. every 2-3 minutes until assistance arrives  Qty: 2 each, Refills: 0    Associated Diagnoses: Mononeuropathy due to underlying disease; Pain of right lower extremity      tiZANidine (ZANAFLEX) 2 MG tablet Take 1 tablet (2 mg) by mouth 3 times daily as needed for muscle spasms.  Qty: 90 tablet, Refills: 3    Associated Diagnoses: RSD (reflex sympathetic dystrophy)           STOP taking these medications       metFORMIN (GLUCOPHAGE) 500 MG tablet Comments:   Reason for Stopping:             Allergies   No Known Allergies    Blood Glucose Checks: Three times daily before meals, and at bedtime.  or resume your CGM     Medications:      Long Acting Insulin   Lantus 20 units once daily at 1200     Short Acting Meal Insulin:    Novolog 8 unit(s) for bigger meals (60 g cho) and 4 unit(s) for smaller snacks (25-30 g cho)with meals plus correction scale:   Novolog correction scale three times a day  before meals and at bedtime. Do not take more frequently than every 4 hours. See scale below:      Diabetes Outpatient Follow-up: Follow up with  your PCP,  in 1-2 weeks after discharge for your diabetes. Follow up sooner if blood glucose runs consistently greater than 180 mg/dL or any episodes less than 70 mg/dL.      Hypoglycemia (Low Blood Glucose) Management:  If blood glucose is 51 to 69:   Eat or drink 15 grams of carbohydrate. Examples:   1/2 cup (4 ounces) apple juice or regular soda pop, or   1 cup (8 ounces) milk, or   15 skittles, or   3 to 4 glucose tablets.   Re-check your blood glucose in 15 minutes.   Repeat these steps every 15 minutes until your blood glucose is above 80.       If blood glucose is 50 or less:   Eat or drink 30 grams of carbohydrate. Examples:   1 cup (8 ounces) apple juice or regular soda pop, or   2 cups (16 ounces) milk, or   1 banana, or   6 to 8 glucose tablets.   Re-check your blood glucose in 15 minutes.   Repeat these steps every 15 minutes until your blood glucose is above 80.      Thank you for letting the Diabetes Management Team be involved in your care!      Instructions to patient were posted in AVS and discussed      Primary Team to order Medications and Supplies:   Medications and supplies are to be ordered by primary service on discharge.   Contour Next glucose meter   Contour Next  test strips   Contour Microlet lancets   Sharps container   Alcohol wipes   B-D 4 mm tiffanie pen needles   Lantus Solostar pens   Novolog Flexpens   Dexcom G7

## 2025-07-14 NOTE — PROGRESS NOTES
"PT Evaluation     Today's date: 2025  Patient name: Em Koo  : 1970  MRN: [id number]  Referring provider: Mal Irene MD  Dx:   Encounter Diagnosis     ICD-10-CM    1. Right hip pain  M25.551       2. Status post right hip replacement  Z96.641       3. Pain in right hip  M25.551             Start Time: 1115  Stop Time: 1200  Total time in clinic (min): 45 minutes    Assessment  Impairments: abnormal or restricted ROM, activity intolerance, impaired physical strength and pain with function    Assessment details: Em has now attended 11 visits since starting PT. She reports a GROC improvement of 60% thus far and notes that the remaining deficits may be \"mental\" or due to fear. She has improved her R hip AROM and strength as evidenced by objective testing today. Additionally, she has improved the TUG from 18 seconds to 7 seconds and LEFS from 11/80 to 55/80 demonstrating significant functional progress. Patient is still showing signs of a hip flexor tendinopathy on her surgical side but is very diligent with her HEP. She would like to progress to a formal HEP today and stop attending PT. Discussed with patient updated HEP and activity modification with possible hip flexor tendinopathy.   Understanding of Dx/Px/POC: good     Prognosis: good    Goals  STG: To be completed in 4 weeks from 2025.   1. Em will be independent with basic HEP to allow patient to complete exercises safely when not directly supervised during PT session.  (Met)  2. Em will report pain no worse than 4/10 at worst to indicate decreased pain level and improved tolerance to activity. (6/10)  3. Em will have improved right hip flexion ROM to 75* in order to indicate progression to promoting normalized gait pattern.  (Met)  4. Em will have improved right hip strength to 4-/5 in all motion to improve quality of ambulation and stair climbing. (Met)  5. Em will be able to ambulate at least 200ft in the " clinic without assistive device and normalized gait mechanics. (Met)  6. Em will improve LEFS by at least 9 points to demonstrate significant functional improvement.  (Met)        LTG: To be completed in 10 weeks from 5/28/2025  or upon discharge from PT.   1. Em will be independent with advanced home exercise program to allow patient to transition from physical therapy care to continue with plan of care at home without supervision from therapist and continue to progress with rehabilitation. (Met)  2. Em will report pain no worse than 2/10 at worst to indicate decreased pain level and improved tolerance to activity. (6/10)  3. Em will report 75% improvement in symptoms compared to start of POC to indicate functional improvement and decrease level of disability.  (60%)  4. Em will tolerate single limb stance for 10 seconds on right leg to allow patient to have increased stance time on right leg during ambulation. (Met)  5. Em will have improved right hip flexion ROM to 120* and right hip extension ROM to 30* in order to indicate normal hip ROM. (105)  6. Em will have improved right hip strength to 5/5 in all motion to improve quality of ambulation and stair climbing. (4+/5)  7. [place] decreased TUG time by 3 seconds to indicate functional improvement and decreased fall risk. (Met)  8. Em will improve LEFS to at least 65/80 (55/80)  9. Em will return to work without impairment as a  at a [employer]. (Met)        Plan  Patient would benefit from: skilled physical therapy and PT eval  Planned modality interventions: biofeedback, cryotherapy, electrical stimulation/Russian stimulation, neuromuscular electric stimulation, TENS, unattended electrical stimulation and thermotherapy: hydrocollator packs    Planned therapy interventions: IASTM, joint mobilization, activity modification, kinesiology taping, manual therapy, massage, Greco taping, balance, balance/weight bearing  training, nerve gliding, body mechanics training, neuromuscular re-education, patient/caregiver education, strengthening, stretching, therapeutic activities, therapeutic exercise, therapeutic training, home exercise program, graded exercise, graded activity, gait training, functional ROM exercises and flexibility    Frequency: 1x week  Duration in weeks: 1  Plan of Care beginning date: 2025  Plan of Care expiration date: 2025  Treatment plan discussed with: patient    Subjective Evaluation    History of Present Illness  Date of surgery: 2025  Mechanism of injury: surgery  Mechanism of injury: Em is a 54 yr old female presenting back to OPPT s/p R JANNET, Anterior Approach due to 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 Surgery was completed by Dr. Newton on 2025. She was discharged home on .       RE: Patient reports a GROC improvement of 60% thus far and trending up.She states that part of it is mental and wants to wait about 6 months until she starts running again. She notes that putting on certain socks are difficult to get on. She notes continued groin pain when getting up after sitting usually but has noticed a good improvement in the last week and no longer has to wait until taking the first step.   Quality of life: good    Patient Goals  Patient goals for therapy: increased strength, independence with ADLs/IADLs, decreased pain, improved balance, return to sport/leisure activities, return to work and increased motion  Patient goal: be able to walk the dog without an assistive device and get back to work (met)  Pain  Current pain ratin  At best pain ratin  At worst pain ratin (after walking 2 miles and then having to sit and drive for a while)  Location: R lateral/anterior hip  Quality: burning and sharp  Relieving factors: ice, medications, change in position and rest  Aggravating factors: stair climbing, walking, standing and  "sitting    Social Support  Steps to enter house: yes  2  Stairs in house: yes   13  Lives in: multiple-level home  Lives with: spouse and adult children    Employment status: not working (works at [employer] as a  but currently out of work til healed)  Exercise history: run, bike, walking the dog, go to the gym, swimming    Treatments  Previous treatment: physical therapy    Objective     Active Range of Motion   Left Hip   Flexion: 110 degrees     Right Hip   Flexion: 105 degrees with pain    Strength/Myotome Testing     Left Hip   Planes of Motion   Flexion: 5  Abduction: 5    Right Hip   Planes of Motion   Flexion: 4+  Abduction: 4    Left Knee   Prone flexion: 5  Extension: 5    Right Knee   Prone flexion: 5  Extension: 5    Ambulation   Weight-Bearing Status   Weight-Bearing Status (Right): full weight-bearing    Assistive device used: none    Ambulation: Level Surfaces   Ambulation without assistive device: independent    Observational Gait   Gait: within functional limits   Walking speed and stride length within functional limits.     General Comments:      Hip Comments   LEFS: 11/80 at IE; LEFS: 55/80 at RE on   TU seconds at IE; TU seconds at RE on              Precautions: L JANNET      Manuals  6/2 6/4 6/10 6/13 6/16 6/23 6/30 7/7 7/14    #1 #2 #3 #4 #5 #6 #7 8 #9 #10 #11   Re-Evaluation           MF                               Neuro Re-Ed              Quad Sets HEP HEP HEP           Education MF  HEP and POC MF  HEP and POC MF  HEP and POC MF  HEP and POC MF  HEP and POC   RG  HEP, POC  MF HEP  MF   Tandem on Airex  4 x :15 4 x :15  4 x :15   4x15\" holds 30\" 4 x :30 holds    Hip Hikes   RLE 2 x 10 RLE 2 x 10 RLE 2 x 10 2 x 10 b/l 2 x 10 b/l 2 x 10 b/l 2 x 10 b/l 2 x 10 b/l    Marching Walking     3 laps b/w plinths 3 laps at handrail 3 laps at handrail 4 laps  3\" holds at handrail HOLD     SLS Blaze Pods      4 x :30  4x30\" b/l 4 x :30 b/l                   Ther Ex        " "      HR/TR HEP 2 x 10 2 x 10 2 x 10 2 x 10         Std Hip ABD HEP 2 x 10 2 x 10 2 x 10 2 x 10 b/l         LAQ   2 x 10 with :03 hold           Hamstring stretch         3x30\" holds R LE only  3x30\" holds R LE only  3x30\" holds R LE only     Gentle Lunge/Hip Flexor Stretch      5 x ;10 on airex 5 x :10 on airex RLE only 3x30\" holds R LE only  3x30\" holds R LE only  3x30\" holds R LE only     Ther Activity              Nustep  Seat 8, Lv 1, 8' Seat 8, Lv 1, 10' Seat 6, Lv 6, 10' Seat 6, Lv 7, 10' Seat 6, Lv 7, 10' Seat 6, Lv 7, 10' Seat 6, Lv 7, 10' Seat 6, Lv 7, 10' Seat 6, Lv 7, 10' Seat 6, Lv 7, 10'   Marching  2 x 10 with 2 HHA 2 x 10 with 2 HHA 2 x 10 with 2 HHA          3 way hip        2# CW x10 ea  2#, 2 x 10 abd/ext only RLE     Step Ups  4\" 1 x 10 RLE with 2 HHA 4\" 1 x 10 RLE with 2 HHA  6\" 2 x 10 RLE with 2 HHA 6\" 2 x 10 RLE with 2 HHA 6\" 2 x 10 RLE with 2 HHA  6\" 2 x 10 RLE with 2 HHA 8\" 3 x 10 RLE with 2 HHA    Sit to Stands  2 x 10 with bilateral hands 2 x 10 with bilateral hands 2 x 10 with bilateral hands 2 x 10 without hands on 1 airex pad 2 x 10 without hands on 1 airex pad 2 x 10 without hands on 1 airex pad X10 with airex and  light resistance black loop band      2 x 10 with 10# DB 2 x 10 with 10# DB    Squats  2 x 10 2 x 10  TRX 2 x 15 TRX 2 x 15 TRX 3 x 10  BOSU Squats 2 x 10 BOSU Squats 2 x 10    Sidestepping   3 laps without HHA at handrail 3 x :30 with blaze pods  3 x :30 with blaze pods 3 laps at mirror with blue TB at ankles 1 lap yellow COB at knees 1 lap yellow COB at knees 1 lap yellow COB at knees    Diagonal walking         1 lap yellow COB  at knees HOLD     Leg Press    65# 2 x 10 BLE; 35# 2 x 10 RLE 65# 2 x 10 BLE seat 8; 35# 2 x 10 RLE 65# 2 x 10 BLE seat 8; 35# 2 x 10 RLE 75# 2 x 10 BLE seat 8; 45# 2 x 10 RLE  HOLD     Gait Training              SPC  1 lap in clinic 2 laps around the clinic with supervision 1 lap with SPC around clinic 1 lap with SPC around clinic 1 lap in clinic " "without SPC with SBA No cane 1 lap with SBA No cane 1 lap       Cone Step Overs  6\" with reciprocal pattern and SPC x 2 laps Held due to anterior pinching pain in the right hip 1 lap then held due to anterior pinching with putting foot down    nv      Stairs    5 laps reciprocally with 2 HHA 5 laps reciprocally with 2 HHA         Modalities                                                "

## 2025-07-21 ENCOUNTER — APPOINTMENT (OUTPATIENT)
Age: 55
End: 2025-07-21
Payer: MEDICARE